# Patient Record
Sex: MALE | Race: WHITE | Employment: OTHER | URBAN - METROPOLITAN AREA
[De-identification: names, ages, dates, MRNs, and addresses within clinical notes are randomized per-mention and may not be internally consistent; named-entity substitution may affect disease eponyms.]

---

## 2017-05-08 ENCOUNTER — APPOINTMENT (OUTPATIENT)
Dept: GENERAL RADIOLOGY | Age: 62
DRG: 280 | End: 2017-05-08
Attending: FAMILY MEDICINE
Payer: MEDICARE

## 2017-05-08 ENCOUNTER — HOSPITAL ENCOUNTER (INPATIENT)
Age: 62
LOS: 10 days | Discharge: HOME | DRG: 280 | End: 2017-05-18
Attending: FAMILY MEDICINE | Admitting: FAMILY MEDICINE
Payer: MEDICARE

## 2017-05-08 DIAGNOSIS — R06.02 SHORTNESS OF BREATH: ICD-10-CM

## 2017-05-08 DIAGNOSIS — G89.4 CHRONIC PAIN SYNDROME: ICD-10-CM

## 2017-05-08 DIAGNOSIS — M54.2 NECK PAIN: ICD-10-CM

## 2017-05-08 DIAGNOSIS — R53.83 FATIGUE, UNSPECIFIED TYPE: ICD-10-CM

## 2017-05-08 PROBLEM — T40.601A OVERDOSE OPIATE (HCC): Status: ACTIVE | Noted: 2017-05-08

## 2017-05-08 PROBLEM — J18.9 PNEUMONIA: Status: ACTIVE | Noted: 2017-05-08

## 2017-05-08 PROBLEM — I21.4 NSTEMI (NON-ST ELEVATED MYOCARDIAL INFARCTION) (HCC): Status: ACTIVE | Noted: 2017-05-08

## 2017-05-08 PROBLEM — G89.29 CHRONIC PAIN: Status: ACTIVE | Noted: 2017-05-08

## 2017-05-08 LAB
ALBUMIN SERPL BCP-MCNC: 2.1 G/DL (ref 3.5–5)
ALBUMIN/GLOB SERPL: 0.6 {RATIO} (ref 1.1–2.2)
ALP SERPL-CCNC: 66 U/L (ref 45–117)
ALT SERPL-CCNC: 41 U/L (ref 12–78)
ANION GAP BLD CALC-SCNC: 6 MMOL/L (ref 5–15)
APPEARANCE UR: ABNORMAL
APTT PPP: 29.2 SEC (ref 22.1–32.5)
AST SERPL W P-5'-P-CCNC: 48 U/L (ref 15–37)
BACTERIA URNS QL MICRO: NEGATIVE /HPF
BASOPHILS # BLD AUTO: 0 K/UL (ref 0–0.1)
BASOPHILS # BLD: 1 % (ref 0–1)
BILIRUB SERPL-MCNC: 0.4 MG/DL (ref 0.2–1)
BILIRUB UR QL CFM: NEGATIVE
BUN SERPL-MCNC: 18 MG/DL (ref 6–20)
BUN/CREAT SERPL: 27 (ref 12–20)
CALCIUM SERPL-MCNC: 7.9 MG/DL (ref 8.5–10.1)
CHLORIDE SERPL-SCNC: 107 MMOL/L (ref 97–108)
CO2 SERPL-SCNC: 28 MMOL/L (ref 21–32)
COLOR UR: ABNORMAL
CREAT SERPL-MCNC: 0.67 MG/DL (ref 0.7–1.3)
EOSINOPHIL # BLD: 0.3 K/UL (ref 0–0.4)
EOSINOPHIL NFR BLD: 5 % (ref 0–7)
EPITH CASTS URNS QL MICRO: ABNORMAL /LPF
ERYTHROCYTE [DISTWIDTH] IN BLOOD BY AUTOMATED COUNT: 16 % (ref 11.5–14.5)
GLOBULIN SER CALC-MCNC: 3.7 G/DL (ref 2–4)
GLUCOSE SERPL-MCNC: 90 MG/DL (ref 65–100)
GLUCOSE UR STRIP.AUTO-MCNC: NEGATIVE MG/DL
HCT VFR BLD AUTO: 34.7 % (ref 36.6–50.3)
HGB BLD-MCNC: 11.4 G/DL (ref 12.1–17)
HGB UR QL STRIP: ABNORMAL
KETONES UR QL STRIP.AUTO: ABNORMAL MG/DL
LACTATE SERPL-SCNC: 1.4 MMOL/L (ref 0.4–2)
LEUKOCYTE ESTERASE UR QL STRIP.AUTO: NEGATIVE
LYMPHOCYTES # BLD AUTO: 30 % (ref 12–49)
LYMPHOCYTES # BLD: 1.9 K/UL (ref 0.8–3.5)
MCH RBC QN AUTO: 30.3 PG (ref 26–34)
MCHC RBC AUTO-ENTMCNC: 32.9 G/DL (ref 30–36.5)
MCV RBC AUTO: 92.3 FL (ref 80–99)
MONOCYTES # BLD: 0.8 K/UL (ref 0–1)
MONOCYTES NFR BLD AUTO: 13 % (ref 5–13)
NEUTS SEG # BLD: 3.1 K/UL (ref 1.8–8)
NEUTS SEG NFR BLD AUTO: 51 % (ref 32–75)
NITRITE UR QL STRIP.AUTO: NEGATIVE
PH UR STRIP: 6 [PH] (ref 5–8)
PLATELET # BLD AUTO: 72 K/UL (ref 150–400)
POTASSIUM SERPL-SCNC: 4.4 MMOL/L (ref 3.5–5.1)
PROT SERPL-MCNC: 5.8 G/DL (ref 6.4–8.2)
PROT UR STRIP-MCNC: 100 MG/DL
RBC # BLD AUTO: 3.76 M/UL (ref 4.1–5.7)
RBC #/AREA URNS HPF: ABNORMAL /HPF (ref 0–5)
SODIUM SERPL-SCNC: 141 MMOL/L (ref 136–145)
SP GR UR REFRACTOMETRY: 1.02 (ref 1–1.03)
THERAPEUTIC RANGE,PTTT: NORMAL SECS (ref 58–77)
TROPONIN I SERPL-MCNC: 0.15 NG/ML
UROBILINOGEN UR QL STRIP.AUTO: 2 EU/DL (ref 0.2–1)
WBC # BLD AUTO: 6.2 K/UL (ref 4.1–11.1)
WBC URNS QL MICRO: ABNORMAL /HPF (ref 0–4)

## 2017-05-08 PROCEDURE — 65620000000 HC RM CCU GENERAL

## 2017-05-08 PROCEDURE — 87205 SMEAR GRAM STAIN: CPT | Performed by: FAMILY MEDICINE

## 2017-05-08 PROCEDURE — 85025 COMPLETE CBC W/AUTO DIFF WBC: CPT | Performed by: FAMILY MEDICINE

## 2017-05-08 PROCEDURE — 85730 THROMBOPLASTIN TIME PARTIAL: CPT | Performed by: FAMILY MEDICINE

## 2017-05-08 PROCEDURE — 81001 URINALYSIS AUTO W/SCOPE: CPT | Performed by: FAMILY MEDICINE

## 2017-05-08 PROCEDURE — 74011250636 HC RX REV CODE- 250/636: Performed by: INTERNAL MEDICINE

## 2017-05-08 PROCEDURE — 74011000258 HC RX REV CODE- 258: Performed by: FAMILY MEDICINE

## 2017-05-08 PROCEDURE — 74011250636 HC RX REV CODE- 250/636: Performed by: FAMILY MEDICINE

## 2017-05-08 PROCEDURE — 87070 CULTURE OTHR SPECIMN AEROBIC: CPT | Performed by: FAMILY MEDICINE

## 2017-05-08 PROCEDURE — 74000 XR ABD PORT  1 V: CPT

## 2017-05-08 PROCEDURE — 80053 COMPREHEN METABOLIC PANEL: CPT | Performed by: FAMILY MEDICINE

## 2017-05-08 PROCEDURE — 94002 VENT MGMT INPAT INIT DAY: CPT

## 2017-05-08 PROCEDURE — 84484 ASSAY OF TROPONIN QUANT: CPT | Performed by: FAMILY MEDICINE

## 2017-05-08 PROCEDURE — 83605 ASSAY OF LACTIC ACID: CPT | Performed by: FAMILY MEDICINE

## 2017-05-08 PROCEDURE — 74011250637 HC RX REV CODE- 250/637: Performed by: FAMILY MEDICINE

## 2017-05-08 PROCEDURE — 5A1945Z RESPIRATORY VENTILATION, 24-96 CONSECUTIVE HOURS: ICD-10-PCS | Performed by: INTERNAL MEDICINE

## 2017-05-08 PROCEDURE — 36415 COLL VENOUS BLD VENIPUNCTURE: CPT | Performed by: FAMILY MEDICINE

## 2017-05-08 PROCEDURE — 71010 XR CHEST PORT: CPT

## 2017-05-08 RX ORDER — PROPOFOL 10 MG/ML
5-50 VIAL (ML) INTRAVENOUS
Status: DISCONTINUED | OUTPATIENT
Start: 2017-05-08 | End: 2017-05-12

## 2017-05-08 RX ORDER — GUAIFENESIN 100 MG/5ML
81 LIQUID (ML) ORAL DAILY
Status: DISCONTINUED | OUTPATIENT
Start: 2017-05-09 | End: 2017-05-18 | Stop reason: HOSPADM

## 2017-05-08 RX ORDER — SODIUM CHLORIDE 0.9 % (FLUSH) 0.9 %
5-10 SYRINGE (ML) INJECTION EVERY 8 HOURS
Status: DISCONTINUED | OUTPATIENT
Start: 2017-05-08 | End: 2017-05-18 | Stop reason: HOSPADM

## 2017-05-08 RX ORDER — MIDAZOLAM IN 0.9 % SOD.CHLORID 1 MG/ML
12 PLASTIC BAG, INJECTION (ML) INTRAVENOUS
Status: DISCONTINUED | OUTPATIENT
Start: 2017-05-08 | End: 2017-05-09

## 2017-05-08 RX ORDER — SODIUM CHLORIDE 0.9 % (FLUSH) 0.9 %
5-10 SYRINGE (ML) INJECTION AS NEEDED
Status: DISCONTINUED | OUTPATIENT
Start: 2017-05-08 | End: 2017-05-18 | Stop reason: HOSPADM

## 2017-05-08 RX ORDER — CARVEDILOL 3.12 MG/1
3.12 TABLET ORAL 2 TIMES DAILY WITH MEALS
Status: DISCONTINUED | OUTPATIENT
Start: 2017-05-09 | End: 2017-05-09

## 2017-05-08 RX ORDER — FUROSEMIDE 10 MG/ML
40 INJECTION INTRAMUSCULAR; INTRAVENOUS 2 TIMES DAILY
Status: DISCONTINUED | OUTPATIENT
Start: 2017-05-08 | End: 2017-05-13

## 2017-05-08 RX ORDER — HEPARIN SODIUM 10000 [USP'U]/100ML
9-25 INJECTION, SOLUTION INTRAVENOUS
Status: DISPENSED | OUTPATIENT
Start: 2017-05-08 | End: 2017-05-11

## 2017-05-08 RX ORDER — ATORVASTATIN CALCIUM 40 MG/1
40 TABLET, FILM COATED ORAL EVERY EVENING
Status: DISCONTINUED | OUTPATIENT
Start: 2017-05-08 | End: 2017-05-18 | Stop reason: HOSPADM

## 2017-05-08 RX ORDER — FLUCONAZOLE 2 MG/ML
400 INJECTION, SOLUTION INTRAVENOUS EVERY 24 HOURS
Status: DISCONTINUED | OUTPATIENT
Start: 2017-05-08 | End: 2017-05-10

## 2017-05-08 RX ORDER — LEVOFLOXACIN 5 MG/ML
750 INJECTION, SOLUTION INTRAVENOUS EVERY 24 HOURS
Status: DISCONTINUED | OUTPATIENT
Start: 2017-05-08 | End: 2017-05-12

## 2017-05-08 RX ADMIN — ATORVASTATIN CALCIUM 40 MG: 40 TABLET, FILM COATED ORAL at 22:12

## 2017-05-08 RX ADMIN — FUROSEMIDE 40 MG: 10 INJECTION, SOLUTION INTRAMUSCULAR; INTRAVENOUS at 22:12

## 2017-05-08 RX ADMIN — HEPARIN SODIUM AND DEXTROSE 9 UNITS/KG/HR: 10000; 5 INJECTION INTRAVENOUS at 22:48

## 2017-05-08 RX ADMIN — Medication 10 ML: at 22:13

## 2017-05-08 RX ADMIN — LEVOFLOXACIN 750 MG: 5 INJECTION, SOLUTION INTRAVENOUS at 22:12

## 2017-05-08 RX ADMIN — FLUCONAZOLE 400 MG: 2 INJECTION INTRAVENOUS at 22:12

## 2017-05-08 RX ADMIN — PIPERACILLIN SODIUM,TAZOBACTAM SODIUM 3.38 G: 3; .375 INJECTION, POWDER, FOR SOLUTION INTRAVENOUS at 22:12

## 2017-05-08 RX ADMIN — PROPOFOL 20 MCG/KG/MIN: 10 INJECTION, EMULSION INTRAVENOUS at 20:46

## 2017-05-08 RX ADMIN — Medication 12 MG/HR: at 19:11

## 2017-05-08 NOTE — PROGRESS NOTES
1630: TRANSFER - IN REPORT:  Verbal report received from [facility] (name) on [facility]  being received from [facility]  (unit) for change in patient condition(CHF exacerbation )    Report consisted of patients Situation, Background, Assessment and   Recommendations(SBAR). Information from the following report(s) SBAR, Kardex, Intake/Output, MAR and Recent Results was reviewed with the receiving nurse. Opportunity for questions and clarification was provided. Assessment completed upon patients arrival to unit and care assumed. 1900: Patient arrives to unit with versed gtt infusing, intubated, and in bilateral wrist restraints. Primary Nurse Stefanie Mendes RN and Hien Carreon RN performed a dual skin assessment on this patient Impairment noted- see wound doc flow sheet  Kavin score is 12  1925: ESBL swabs sent. 1930: Bedside and Verbal shift change report given to [address] [facility]  (oncoming nurse) by [address] (offgoing nurse). Report included the following information SBAR, Kardex, Intake/Output, MAR and Recent Results.

## 2017-05-08 NOTE — IP AVS SNAPSHOT
[address] 
[phone] Patient: Miley Raygoza MRN: [id number] VJK:[id number] You are allergic to the following Allergen Reactions Iodine Rash Recent Documentation Height Weight BMI  
  
  
 1.676 m 91.1 kg 32.41 kg/m2 Emergency Contacts  (Rel.) Home Phone Work Phone Mobile Phone Princess Melendrez (Spouse) [phone] -- -- About your hospitalization You were admitted on: May 8, 2017 You last received care in the:  [facility] You were discharged on:  May 18, 2017 Why you were hospitalized Your primary diagnosis was:  Nstemi (Non-St Elevated Myocardial Infarction) (Hcc) Your diagnoses also included:  Pneumonia, Overdose Opiate, Chronic Pain Providers Seen During Your Hospitalizations Provider Role Specialty Primary office phone Carl Cao MD Attending Provider Hospitalist [phone] Ana Mills MD Attending Provider Internal Medicine [phone] Fanta Horner MD Attending Provider Internal Medicine [phone] Your Primary Care Physician (PCP) Primary Care Physician Office Phone Office Fax Salters Call [phone] Follow-up Information Follow up With Details Comments Contact Info MRM 2 CARDIOPULMONARY CARE Call in 1 week enrollment in cardiac rehab [address] 
[phone] Monse Joseph In 1 week f/u for heart failure and for general medical as well as recent PNA [address] [address] [facility] [address] [phone] Current Discharge Medication List  
  
START taking these medications Dose & Instructions Dispensing Information Comments Morning Noon Evening Bedtime  
 aspirin 81 mg chewable tablet Your last dose was: Your next dose is:    
   
   
 Dose:  81 mg Take 1 Tab by mouth daily. Quantity:  100 Tab Refills:  0  
     
   
   
   
  
 atorvastatin 40 mg tablet Commonly known as:  LIPITOR Your last dose was: Your next dose is:    
   
   
 Dose:  40 mg Take 1 Tab by mouth every evening. Quantity:  30 Tab Refills:  0  
     
   
   
   
  
 carvedilol 12.5 mg tablet Commonly known as:  Meghan Bosworth Your last dose was: Your next dose is:    
   
   
 Dose:  12.5 mg Take 1 Tab by mouth two (2) times daily (with meals). Quantity:  60 Tab Refills:  0  
     
   
   
   
  
 furosemide 40 mg tablet Commonly known as:  LASIX Your last dose was: Your next dose is:    
   
   
 Dose:  80 mg Take 2 Tabs by mouth daily. Quantity:  60 Tab Refills:  0  
     
   
   
   
  
 polyethylene glycol 17 gram packet Commonly known as:  Elbridge Humphries Your last dose was: Your next dose is:    
   
   
 Dose:  17 g Take 1 Packet by mouth daily. Quantity:  100 Packet Refills:  0 CONTINUE these medications which have NOT CHANGED Dose & Instructions Dispensing Information Comments Morning Noon Evening Bedtime DULoxetine 60 mg capsule Commonly known as:  CYMBALTA Your last dose was: Your next dose is:    
   
   
 Dose:  60 mg Take 60 mg by mouth daily. Refills:  0  
     
   
   
   
  
 gabapentin 800 mg tablet Commonly known as:  NEURONTIN Your last dose was: Your next dose is:    
   
   
 Dose:  800 mg Take 800 mg by mouth three (3) times daily. Refills:  0  
     
   
   
   
  
 lansoprazole 30 mg capsule Commonly known as:  PREVACID Your last dose was: Your next dose is:    
   
   
 Dose:  30 mg Take 30 mg by mouth Daily (before breakfast). Refills:  0  
     
   
   
   
  
 * Morphine 100 mg ER capsule Commonly known as:  LAUREN Your last dose was:     
   
Your next dose is:    
   
   
 Dose:  100 mg  
 Take 100 mg by mouth nightly. Refills:  0  
     
   
   
   
  
 * Morphine 80 mg Cerp Your last dose was: Your next dose is:    
   
   
 Dose:  80 mg Take 80 mg by mouth daily. Refills:  0  
     
   
   
   
  
 ramipril 10 mg capsule Commonly known as:  ALTACE Your last dose was: Your next dose is:    
   
   
 Dose:  10 mg Take 10 mg by mouth daily. Refills:  0  
     
   
   
   
  
 * Notice: This list has 2 medication(s) that are the same as other medications prescribed for you. Read the directions carefully, and ask your doctor or other care provider to review them with you. STOP taking these medications   
 amLODIPine 5 mg tablet Commonly known as:  NORVASC  
   
  
 atenolol 25 mg tablet Commonly known as:  TENORMIN  
   
  
 cloNIDine HCl 0.1 mg tablet Commonly known as:  CATAPRES Where to Get Your Medications Information on where to get these meds will be given to you by the nurse or doctor. ! Ask your nurse or doctor about these medications  
  aspirin 81 mg chewable tablet  
 atorvastatin 40 mg tablet  
 carvedilol 12.5 mg tablet  
 furosemide 40 mg tablet  
 polyethylene glycol 17 gram packet Discharge Instructions Lakeisha Harris Smoking Cessation Program: Below you will find a link to a text/email based smoking cessation program. The program is individualized to meet the patient's needs. To enroll use this link [email or web address]. Discharge Instructions PATIENT ID: Caro Farr MRN: [id number] YOB: 1955 DATE OF ADMISSION: 5/8/2017  6:55 PM   
DATE OF DISCHARGE: 5/18/2017 PRIMARY CARE PROVIDER: Alena Donnelly ATTENDING PHYSICIAN: Haydee Torres MD 
DISCHARGING PROVIDER: Haydee Torres MD   
To contact this individual call [phone] and ask the  to page. If unavailable ask to be transferred the Adult Hospitalist Department. DISCHARGE DIAGNOSES PNA,   
 
CONSULTATIONS: IP CONSULT TO INTENSIVIST 
IP CONSULT TO INFECTIOUS DISEASES 
IP CONSULT TO PULMONOLOGY PROCEDURES/SURGERIES: * No surgery found * PENDING TEST RESULTS:  
At the time of discharge the following test results are still pending: na 
 
FOLLOW UP APPOINTMENTS:  
Follow-up Information Follow up With Details Comments Contact Info MRM 2 CARDIOPULMONARY CARE Call in 1 week enrollment in cardiac rehab [address] [facility] 
[phone] Maru Pavon In 1 week f/u for heart failure and for general medical as well as recent PNA [address] [address] [facility] [address] [phone] ADDITIONAL CARE RECOMMENDATIONS: na 
 
DIET: Cardiac Diet ACTIVITY: Activity as tolerated WOUND CARE: na 
 
EQUIPMENT needed: na 
 
 
  
 SNF/Inpatient Rehab/LTAC  
x Independent/assisted living Hospice Other:  
 
  
  
Signed:  
Keara Fuller MD 
5/18/2017 
8:36 AM 
 
 
Discharge Instructions Attachments/References HEART FAILURE: AVOIDING TRIGGERS (ENGLISH) Discharge Orders None MyChart Announcement  We are excited to announce that we are making your provider's discharge notes available to you in [employer]. You will see these notes when they are completed and signed by the physician that discharged you from your recent hospital stay. If you have any questions or concerns about any information you see in [employer], please call the Health Information Department where you were seen or reach out to your Primary Care Provider for more information about your plan of care. Introducing [facility]! Peg Hemp introduces [employer] patient portal. Now you can access parts of your medical record, email your doctor's office, and request medication refills online. 1. In your internet browser, go to [email or web address] 2. Click on the First Time User? Click Here link in the Sign In box. You will see the New Member Sign Up page. 3. Enter your [employer] Access Code exactly as it appears below. You will not need to use this code after youve completed the sign-up process. If you do not sign up before the expiration date, you must request a new code. · [employer] Access Code: [email or web address] Expires: 8/6/2017  6:54 PM 
 
4. Enter the last four digits of your Social Security Number (xxxx) and Date of Birth (mm/dd/yyyy) as indicated and click Submit. You will be taken to the next sign-up page. 5. Create a [email or web address] ID. This will be your [employer] login ID and cannot be changed, so think of one that is secure and easy to remember. 6. Create a [employer] password. You can change your password at any time. 7. Enter your Password Reset Question and Answer. This can be used at a later time if you forget your password. 8. Enter your e-mail address. You will receive e-mail notification when new information is available in [address] 9. Click Sign Up. You can now view and download portions of your medical record. 10. Click the Download Summary menu link to download a portable copy of your medical information. If you have questions, please visit the Frequently Asked Questions section of the MyChart website. Remember, MyChart is NOT to be used for urgent needs. For medical emergencies, dial 911. Now available from your iPhone and Android! General Information Please provide this summary of care documentation to your next provider. Patient Signature:  ____________________________________________________________ Date:  ____________________________________________________________  
  
Lisa De La Rosa Provider Signature:  ____________________________________________________________ Date:  ____________________________________________________________

## 2017-05-09 ENCOUNTER — APPOINTMENT (OUTPATIENT)
Dept: GENERAL RADIOLOGY | Age: 62
DRG: 280 | End: 2017-05-09
Attending: INTERNAL MEDICINE
Payer: MEDICARE

## 2017-05-09 ENCOUNTER — APPOINTMENT (OUTPATIENT)
Dept: CT IMAGING | Age: 62
DRG: 280 | End: 2017-05-09
Attending: INTERNAL MEDICINE
Payer: MEDICARE

## 2017-05-09 LAB
APPEARANCE UR: CLEAR
APTT PPP: 109.9 SEC (ref 22.1–32.5)
APTT PPP: 29.1 SEC (ref 22.1–32.5)
APTT PPP: 32.7 SEC (ref 22.1–32.5)
ARTERIAL PATENCY WRIST A: YES
ATRIAL RATE: 51 BPM
BACTERIA URNS QL MICRO: NEGATIVE /HPF
BASE EXCESS BLD CALC-SCNC: 3 MMOL/L
BASE EXCESS BLD CALC-SCNC: 3 MMOL/L
BASE EXCESS BLD CALC-SCNC: 7 MMOL/L
BASOPHILS # BLD AUTO: 0 K/UL (ref 0–0.1)
BASOPHILS # BLD: 0 % (ref 0–1)
BDY SITE: ABNORMAL
BILIRUB UR QL: NEGATIVE
CA-I BLD-SCNC: 1.1 MMOL/L (ref 1.12–1.32)
CALCULATED P AXIS, ECG09: 63 DEGREES
CALCULATED R AXIS, ECG10: 31 DEGREES
CALCULATED T AXIS, ECG11: 157 DEGREES
COLOR UR: ABNORMAL
DIAGNOSIS, 93000: NORMAL
DIFFERENTIAL METHOD BLD: ABNORMAL
EOSINOPHIL # BLD: 0.3 K/UL (ref 0–0.4)
EOSINOPHIL NFR BLD: 5 % (ref 0–7)
EPITH CASTS URNS QL MICRO: ABNORMAL /LPF
ERYTHROCYTE [DISTWIDTH] IN BLOOD BY AUTOMATED COUNT: 15.7 % (ref 11.5–14.5)
GAS FLOW.O2 O2 DELIVERY SYS: ABNORMAL L/MIN
GAS FLOW.O2 SETTING OXYMISER: 20 BPM
GAS FLOW.O2 SETTING OXYMISER: 24 BPM
GLUCOSE UR STRIP.AUTO-MCNC: NEGATIVE MG/DL
HCO3 BLD-SCNC: 25.8 MMOL/L (ref 22–26)
HCO3 BLD-SCNC: 26.5 MMOL/L (ref 22–26)
HCO3 BLD-SCNC: 33 MMOL/L (ref 22–26)
HCT VFR BLD AUTO: 33 % (ref 36.6–50.3)
HGB BLD-MCNC: 11 G/DL (ref 12.1–17)
HGB UR QL STRIP: ABNORMAL
HYALINE CASTS URNS QL MICRO: ABNORMAL /LPF (ref 0–5)
KETONES UR QL STRIP.AUTO: NEGATIVE MG/DL
LEUKOCYTE ESTERASE UR QL STRIP.AUTO: NEGATIVE
LYMPHOCYTES # BLD AUTO: 39 % (ref 12–49)
LYMPHOCYTES # BLD: 2.6 K/UL (ref 0.8–3.5)
MAGNESIUM SERPL-MCNC: 2.2 MG/DL (ref 1.6–2.4)
MCH RBC QN AUTO: 30.6 PG (ref 26–34)
MCHC RBC AUTO-ENTMCNC: 33.3 G/DL (ref 30–36.5)
MCV RBC AUTO: 91.9 FL (ref 80–99)
METAMYELOCYTES NFR BLD MANUAL: 2 %
MONOCYTES # BLD: 0.4 K/UL (ref 0–1)
MONOCYTES NFR BLD AUTO: 6 % (ref 5–13)
MYELOCYTES NFR BLD MANUAL: 3 %
NEUTS SEG # BLD: 3 K/UL (ref 1.8–8)
NEUTS SEG NFR BLD AUTO: 45 % (ref 32–75)
NITRITE UR QL STRIP.AUTO: NEGATIVE
O2/TOTAL GAS SETTING VFR VENT: 60 %
P-R INTERVAL, ECG05: 116 MS
PCO2 BLD: 29.2 MMHG (ref 35–45)
PCO2 BLD: 38.2 MMHG (ref 35–45)
PCO2 BLD: 67.3 MMHG (ref 35–45)
PEEP RESPIRATORY: 5 CMH2O
PH BLD: 7.3 [PH] (ref 7.35–7.45)
PH BLD: 7.45 [PH] (ref 7.35–7.45)
PH BLD: 7.55 [PH] (ref 7.35–7.45)
PH UR STRIP: 7 [PH] (ref 5–8)
PLATELET # BLD AUTO: 243 K/UL (ref 150–400)
PLATELET COMMENTS,PCOM: ABNORMAL
PO2 BLD: 62 MMHG (ref 80–100)
PO2 BLD: 68 MMHG (ref 80–100)
PO2 BLD: 92 MMHG (ref 80–100)
PROT UR STRIP-MCNC: NEGATIVE MG/DL
Q-T INTERVAL, ECG07: 570 MS
QRS DURATION, ECG06: 88 MS
QTC CALCULATION (BEZET), ECG08: 525 MS
RBC # BLD AUTO: 3.59 M/UL (ref 4.1–5.7)
RBC #/AREA URNS HPF: ABNORMAL /HPF (ref 0–5)
RBC MORPH BLD: ABNORMAL
SAO2 % BLD: 90 % (ref 92–97)
SAO2 % BLD: 95 % (ref 92–97)
SAO2 % BLD: 98 % (ref 92–97)
SP GR UR REFRACTOMETRY: 1.01 (ref 1–1.03)
SPECIMEN TYPE: ABNORMAL
THERAPEUTIC RANGE,PTTT: ABNORMAL SECS (ref 58–77)
THERAPEUTIC RANGE,PTTT: ABNORMAL SECS (ref 58–77)
THERAPEUTIC RANGE,PTTT: NORMAL SECS (ref 58–77)
TOTAL RESP. RATE, ITRR: 20
TOTAL RESP. RATE, ITRR: 20
TROPONIN I SERPL-MCNC: 0.08 NG/ML
TROPONIN I SERPL-MCNC: 0.12 NG/ML
UA: UC IF INDICATED,UAUC: ABNORMAL
UROBILINOGEN UR QL STRIP.AUTO: 0.2 EU/DL (ref 0.2–1)
VENTILATION MODE VENT: ABNORMAL
VENTRICULAR RATE, ECG03: 51 BPM
VOLUME CONTROL IVLC: YES
VT SETTING VENT: 530 ML
VT SETTING VENT: 530 ML
VT SETTING VENT: 600 ML
WBC # BLD AUTO: 6.7 K/UL (ref 4.1–11.1)
WBC MORPH BLD: ABNORMAL
WBC URNS QL MICRO: ABNORMAL /HPF (ref 0–4)

## 2017-05-09 PROCEDURE — 74011000258 HC RX REV CODE- 258: Performed by: FAMILY MEDICINE

## 2017-05-09 PROCEDURE — 74011000250 HC RX REV CODE- 250: Performed by: INTERNAL MEDICINE

## 2017-05-09 PROCEDURE — 74011250636 HC RX REV CODE- 250/636: Performed by: FAMILY MEDICINE

## 2017-05-09 PROCEDURE — 70450 CT HEAD/BRAIN W/O DYE: CPT

## 2017-05-09 PROCEDURE — 85730 THROMBOPLASTIN TIME PARTIAL: CPT | Performed by: INTERNAL MEDICINE

## 2017-05-09 PROCEDURE — 86022 PLATELET ANTIBODIES: CPT | Performed by: NURSE PRACTITIONER

## 2017-05-09 PROCEDURE — 87040 BLOOD CULTURE FOR BACTERIA: CPT | Performed by: INTERNAL MEDICINE

## 2017-05-09 PROCEDURE — 82803 BLOOD GASES ANY COMBINATION: CPT

## 2017-05-09 PROCEDURE — 85730 THROMBOPLASTIN TIME PARTIAL: CPT | Performed by: FAMILY MEDICINE

## 2017-05-09 PROCEDURE — 74011250637 HC RX REV CODE- 250/637: Performed by: FAMILY MEDICINE

## 2017-05-09 PROCEDURE — 65620000000 HC RM CCU GENERAL

## 2017-05-09 PROCEDURE — 71010 XR CHEST PORT: CPT

## 2017-05-09 PROCEDURE — 74011250637 HC RX REV CODE- 250/637: Performed by: INTERNAL MEDICINE

## 2017-05-09 PROCEDURE — 84484 ASSAY OF TROPONIN QUANT: CPT | Performed by: FAMILY MEDICINE

## 2017-05-09 PROCEDURE — 93005 ELECTROCARDIOGRAM TRACING: CPT

## 2017-05-09 PROCEDURE — 05HY33Z INSERTION OF INFUSION DEVICE INTO UPPER VEIN, PERCUTANEOUS APPROACH: ICD-10-PCS | Performed by: INTERNAL MEDICINE

## 2017-05-09 PROCEDURE — 81001 URINALYSIS AUTO W/SCOPE: CPT | Performed by: INTERNAL MEDICINE

## 2017-05-09 PROCEDURE — 74011250636 HC RX REV CODE- 250/636: Performed by: INTERNAL MEDICINE

## 2017-05-09 PROCEDURE — 74011000258 HC RX REV CODE- 258: Performed by: INTERNAL MEDICINE

## 2017-05-09 PROCEDURE — 36600 WITHDRAWAL OF ARTERIAL BLOOD: CPT

## 2017-05-09 PROCEDURE — 36415 COLL VENOUS BLD VENIPUNCTURE: CPT | Performed by: FAMILY MEDICINE

## 2017-05-09 PROCEDURE — 83735 ASSAY OF MAGNESIUM: CPT | Performed by: INTERNAL MEDICINE

## 2017-05-09 PROCEDURE — 87070 CULTURE OTHR SPECIMN AEROBIC: CPT | Performed by: INTERNAL MEDICINE

## 2017-05-09 PROCEDURE — 85025 COMPLETE CBC W/AUTO DIFF WBC: CPT | Performed by: NURSE PRACTITIONER

## 2017-05-09 PROCEDURE — 94003 VENT MGMT INPAT SUBQ DAY: CPT

## 2017-05-09 RX ORDER — POLYVINYL ALCOHOL 14 MG/ML
2 SOLUTION/ DROPS OPHTHALMIC EVERY 8 HOURS
Status: DISCONTINUED | OUTPATIENT
Start: 2017-05-09 | End: 2017-05-12

## 2017-05-09 RX ORDER — HEPARIN SODIUM 1000 [USP'U]/ML
4000 INJECTION, SOLUTION INTRAVENOUS; SUBCUTANEOUS ONCE
Status: DISCONTINUED | OUTPATIENT
Start: 2017-05-09 | End: 2017-05-09 | Stop reason: SDUPTHER

## 2017-05-09 RX ORDER — CHLORHEXIDINE GLUCONATE 1.2 MG/ML
15 RINSE ORAL 2 TIMES DAILY
Status: DISCONTINUED | OUTPATIENT
Start: 2017-05-09 | End: 2017-05-12

## 2017-05-09 RX ORDER — FENTANYL CITRATE 50 UG/ML
50-100 INJECTION, SOLUTION INTRAMUSCULAR; INTRAVENOUS
Status: DISCONTINUED | OUTPATIENT
Start: 2017-05-09 | End: 2017-05-13

## 2017-05-09 RX ORDER — HEPARIN SODIUM 5000 [USP'U]/ML
4000 INJECTION, SOLUTION INTRAVENOUS; SUBCUTANEOUS ONCE
Status: COMPLETED | OUTPATIENT
Start: 2017-05-09 | End: 2017-05-09

## 2017-05-09 RX ADMIN — POLYVINYL ALCOHOL 2 DROP: 14 SOLUTION/ DROPS OPHTHALMIC at 10:16

## 2017-05-09 RX ADMIN — CHLORHEXIDINE GLUCONATE 15 ML: 1.2 RINSE ORAL at 10:15

## 2017-05-09 RX ADMIN — DEXMEDETOMIDINE HYDROCHLORIDE 0.7 MCG/KG/HR: 100 INJECTION, SOLUTION INTRAVENOUS at 11:03

## 2017-05-09 RX ADMIN — PIPERACILLIN SODIUM,TAZOBACTAM SODIUM 3.38 G: 3; .375 INJECTION, POWDER, FOR SOLUTION INTRAVENOUS at 13:35

## 2017-05-09 RX ADMIN — FLUCONAZOLE 400 MG: 2 INJECTION INTRAVENOUS at 20:43

## 2017-05-09 RX ADMIN — DEXMEDETOMIDINE HYDROCHLORIDE 0.2 MCG/KG/HR: 100 INJECTION, SOLUTION INTRAVENOUS at 20:59

## 2017-05-09 RX ADMIN — HEPARIN SODIUM 4000 UNITS: 5000 INJECTION, SOLUTION INTRAVENOUS; SUBCUTANEOUS at 15:58

## 2017-05-09 RX ADMIN — PIPERACILLIN SODIUM,TAZOBACTAM SODIUM 3.38 G: 3; .375 INJECTION, POWDER, FOR SOLUTION INTRAVENOUS at 22:13

## 2017-05-09 RX ADMIN — CHLORHEXIDINE GLUCONATE 15 ML: 1.2 RINSE ORAL at 18:00

## 2017-05-09 RX ADMIN — FENTANYL CITRATE 100 MCG: 50 INJECTION, SOLUTION INTRAMUSCULAR; INTRAVENOUS at 17:20

## 2017-05-09 RX ADMIN — PROPOFOL 30 MCG/KG/MIN: 10 INJECTION, EMULSION INTRAVENOUS at 18:09

## 2017-05-09 RX ADMIN — POLYVINYL ALCOHOL 2 DROP: 14 SOLUTION/ DROPS OPHTHALMIC at 14:00

## 2017-05-09 RX ADMIN — Medication 10 ML: at 22:13

## 2017-05-09 RX ADMIN — ATORVASTATIN CALCIUM 40 MG: 40 TABLET, FILM COATED ORAL at 17:17

## 2017-05-09 RX ADMIN — LEVOFLOXACIN 750 MG: 5 INJECTION, SOLUTION INTRAVENOUS at 20:48

## 2017-05-09 RX ADMIN — FUROSEMIDE 40 MG: 10 INJECTION, SOLUTION INTRAMUSCULAR; INTRAVENOUS at 08:41

## 2017-05-09 RX ADMIN — HEPARIN SODIUM 4000 UNITS: 5000 INJECTION, SOLUTION INTRAVENOUS; SUBCUTANEOUS at 07:58

## 2017-05-09 RX ADMIN — Medication 10 ML: at 05:28

## 2017-05-09 RX ADMIN — PROPOFOL 30 MCG/KG/MIN: 10 INJECTION, EMULSION INTRAVENOUS at 05:36

## 2017-05-09 RX ADMIN — FENTANYL CITRATE 100 MCG: 50 INJECTION, SOLUTION INTRAMUSCULAR; INTRAVENOUS at 13:36

## 2017-05-09 RX ADMIN — PROPOFOL 25 MCG/KG/MIN: 10 INJECTION, EMULSION INTRAVENOUS at 23:22

## 2017-05-09 RX ADMIN — FUROSEMIDE 40 MG: 10 INJECTION, SOLUTION INTRAMUSCULAR; INTRAVENOUS at 17:17

## 2017-05-09 RX ADMIN — PROPOFOL 40 MCG/KG/MIN: 10 INJECTION, EMULSION INTRAVENOUS at 01:06

## 2017-05-09 RX ADMIN — PIPERACILLIN SODIUM,TAZOBACTAM SODIUM 3.38 G: 3; .375 INJECTION, POWDER, FOR SOLUTION INTRAVENOUS at 05:27

## 2017-05-09 RX ADMIN — HEPARIN SODIUM AND DEXTROSE 17 UNITS/KG/HR: 10000; 5 INJECTION INTRAVENOUS at 15:53

## 2017-05-09 RX ADMIN — ASPIRIN 81 MG CHEWABLE TABLET 81 MG: 81 TABLET CHEWABLE at 09:53

## 2017-05-09 RX ADMIN — PROPOFOL 10 MCG/KG/MIN: 10 INJECTION, EMULSION INTRAVENOUS at 12:54
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
## 2017-05-09 NOTE — PROGRESS NOTES
5/9/2017     Admit Date: 5/8/2017    Admit Diagnosis: nstemi  NSTEMI (non-ST elevated myocardial infarction) [facility])    Principal Problem:    NSTEMI (non-ST elevated myocardial infarction) ([address] (5/8/2017)    Active Problems:    Pneumonia (5/8/2017)      Overdose opiate (5/8/2017)      Chronic pain (5/8/2017)        Assessment/Plan:  1. NSTEMI: peak troponin 2.30 on 5/5/17  2. Preserved EF on yesterday's echo; has diastolic heart failure with very high LV filling pressures from cath (> 30 mmHg) Aug 2016; has not been on diuretics at home recently  3. Pulmonary edema   4. Nonocclusive CAD from Aug 2016 cath  5. Acute respiratory failure with difficulty weaning the patient from the vent over the past several days  6. Deep scooping T wave inversion: coronary ischemia vs CNS   Plan:  1. Continue IV diuretics and heparin  2. Wean from vent as tolerated  3. Cath later this week  4. Head CT today       Subjective:  Sedated on vent       ALONZO Briones does not respond . Objective:     Visit Vitals    /69    Pulse (!) 50    Temp 96.9 °F (36.1 °C)    Resp 20    Wt 103.6 kg (228 lb 6.3 oz)    SpO2 99%        Physical Exam:  Neck: no carotid bruit and no JVD  Heart: regular rate and rhythm, S1, S2 normal  Lungs: clear to auscultation bilaterally, normal percussion bilaterally  Abdomen: soft  Extremities: extremities normal, atraumatic, no cyanosis or edema  Pulses: 2+ and symmetric  Neurologic: Mental status: sedated      Labs:    Recent Labs      05/09/17   0523   TROIQ  0.12*     Recent Labs      05/08/17   2142   NA  141   K  4.4   CL  107   BUN  18   CREA  0.67*   GLU  90   CA  7.9*     Recent Labs      05/09/17   0523   WBC  6.7   HGB  11.0*   HCT  33.0*   PLT  243     No results for input(s): TGL, CHOL, LDLC in the last 72 hours.     No lab exists for component: HDLC,  HBA1C    Telemetry: sinus bradycardia     Data Review: current meds, labs,recent radiology, intake/output/weight and problem list reviewed
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
## 2017-05-09 NOTE — PROCEDURES
Midline Insertion and Progress Note    PRE-PROCEDURE VERIFICATION  Correct Procedure: yes  Correct Site:  yes  Temperature: Temp: 97.4 °F (36.3 °C), Temperature Source: Temp Source: Axillary  Recent Labs      05/09/17 0523 05/08/17 2142   BUN   --   18   CREA   --   0.67*   PLT  243  72*   WBC  6.7  6.2     Allergies: Iodine    PROCEDURE DETAIL  A single lumen midline IV catheter was started for vascular access. The following documentation is in addition to the Midline properties in the lines/airways flowsheet :  Lot #: [id number]  Catheter Total Length: 10 (cm)  Vein Selection for Midline :left basilic  Complication Related to Insertion: none    Line is okay to use.

## 2017-05-09 NOTE — PROGRESS NOTES
Bedside, Verbal and Written shift change report given to Jennie (oncoming nurse) by Deyanira Rincon (offgoing nurse). Report included the following information SBAR, Kardex, MAR and Recent Results. 0800- Assumed care of pt. Pt is in SB- HR 55, Afebrile. Pt is vented. Pt withdraws to painful stimuli. Pupils reactive and brisk-3mm. Pt on diprivan, heparin gtt. Ortega is in place. 65- Spoke with - orders received. 0840- Pt to CT with RN, 2 RT's, on monitor. 5014- Pt returned from CT.  0955- at bedside-orders received  1015- at bedside-orders received. 1227- notified of latest ABG results- stated to increase TV to 600, rate to 24, and recheck ABG in 2 hours. Notified RT at this time. 1541-Repeat ABG obtained-  notified of results- stated to decrease TV to 550, rate to 18, and to recheck ABG in am. Notified RT and placing ABG order.

## 2017-05-09 NOTE — NURSE NAVIGATOR
Chart reviewed by Heart Failure Nurse Navigator. Heart Failure database completed. EF preserved EF per cardiology note. ACEi/ARB: Not indicated. BB: Not indicated. CRT Not indicated. NYHA Functional Class NSTEMI primary diagnosis. Heart Failure Teach Back in Patient Education. Heart Failure Avoiding Triggers on Discharge Instructions.
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
## 2017-05-09 NOTE — CDMP QUERY
Please clarify if this patient is being treated/managed for:    =>Acute on Chronic Diastolic CHF in the setting of pulmonary edema requiring lasix  =>Other Explanation of clinical findings  =>Unable to Determine (no explanation of clinical findings)    The medical record reflects the following clinical findings, treatment, and risk factors:    Risk Factors: hx of CHF  Clinical Indicators: H/P--Possible congestive heart failure exacerbation, Preserved EF on yesterday's echo; has diastolic heart failure with very high LV filling pressures from cath (> 30 mmHg) Aug 2016; has not been on diuretics at home recently. Pulmonary edema   Treatment: lasix    Please clarify and document your clinical opinion in the progress notes and discharge summary including the definitive and/or presumptive diagnosis, (suspected or probable), related to the above clinical findings. Please include clinical findings supporting your diagnosis.     Thank you,         Siobhan Middleton [facility]

## 2017-05-10 ENCOUNTER — APPOINTMENT (OUTPATIENT)
Dept: GENERAL RADIOLOGY | Age: 62
DRG: 280 | End: 2017-05-10
Attending: INTERNAL MEDICINE
Payer: MEDICARE

## 2017-05-10 LAB
ALBUMIN SERPL BCP-MCNC: 2.3 G/DL (ref 3.5–5)
ALBUMIN/GLOB SERPL: 0.5 {RATIO} (ref 1.1–2.2)
ALP SERPL-CCNC: 77 U/L (ref 45–117)
ALT SERPL-CCNC: 40 U/L (ref 12–78)
ANION GAP BLD CALC-SCNC: 10 MMOL/L (ref 5–15)
APTT PPP: 108.8 SEC (ref 22.1–32.5)
APTT PPP: 37.2 SEC (ref 22.1–32.5)
APTT PPP: 44.3 SEC (ref 22.1–32.5)
APTT PPP: 51.6 SEC (ref 22.1–32.5)
ARTERIAL PATENCY WRIST A: YES
AST SERPL W P-5'-P-CCNC: 28 U/L (ref 15–37)
ATRIAL RATE: 75 BPM
BACTERIA SPEC CULT: NORMAL
BASE EXCESS BLD CALC-SCNC: 3 MMOL/L
BASOPHILS # BLD AUTO: 0 K/UL (ref 0–0.1)
BASOPHILS # BLD: 0 % (ref 0–1)
BDY SITE: ABNORMAL
BILIRUB SERPL-MCNC: 0.5 MG/DL (ref 0.2–1)
BNP SERPL-MCNC: 190 PG/ML (ref 0–100)
BUN SERPL-MCNC: 16 MG/DL (ref 6–20)
BUN/CREAT SERPL: 21 (ref 12–20)
CALCIUM SERPL-MCNC: 8.5 MG/DL (ref 8.5–10.1)
CALCULATED P AXIS, ECG09: 61 DEGREES
CALCULATED R AXIS, ECG10: 47 DEGREES
CALCULATED T AXIS, ECG11: 143 DEGREES
CHLORIDE SERPL-SCNC: 102 MMOL/L (ref 97–108)
CK SERPL-CCNC: 98 U/L (ref 39–308)
CO2 SERPL-SCNC: 25 MMOL/L (ref 21–32)
CREAT SERPL-MCNC: 0.77 MG/DL (ref 0.7–1.3)
DIAGNOSIS, 93000: NORMAL
DIFFERENTIAL METHOD BLD: ABNORMAL
EOSINOPHIL # BLD: 0.6 K/UL (ref 0–0.4)
EOSINOPHIL NFR BLD: 8 % (ref 0–7)
ERYTHROCYTE [DISTWIDTH] IN BLOOD BY AUTOMATED COUNT: 15.4 % (ref 11.5–14.5)
GAS FLOW.O2 O2 DELIVERY SYS: ABNORMAL L/MIN
GLOBULIN SER CALC-MCNC: 4.3 G/DL (ref 2–4)
GLUCOSE SERPL-MCNC: 107 MG/DL (ref 65–100)
GRAM STN SPEC: NORMAL
GRAM STN SPEC: NORMAL
HCO3 BLD-SCNC: 27.4 MMOL/L (ref 22–26)
HCT VFR BLD AUTO: 35.8 % (ref 36.6–50.3)
HGB BLD-MCNC: 12.1 G/DL (ref 12.1–17)
INR PPP: 1.1 (ref 0.9–1.1)
LACTATE SERPL-SCNC: 0.6 MMOL/L (ref 0.4–2)
LYMPHOCYTES # BLD AUTO: 27 % (ref 12–49)
LYMPHOCYTES # BLD: 2.2 K/UL (ref 0.8–3.5)
MAGNESIUM SERPL-MCNC: 2.4 MG/DL (ref 1.6–2.4)
MCH RBC QN AUTO: 30.6 PG (ref 26–34)
MCHC RBC AUTO-ENTMCNC: 33.8 G/DL (ref 30–36.5)
MCV RBC AUTO: 90.4 FL (ref 80–99)
MONOCYTES # BLD: 0.8 K/UL (ref 0–1)
MONOCYTES NFR BLD AUTO: 10 % (ref 5–13)
NEUTS SEG # BLD: 4.4 K/UL (ref 1.8–8)
NEUTS SEG NFR BLD AUTO: 55 % (ref 32–75)
NRBC # BLD: 0 K/UL (ref 0–0.01)
NRBC BLD-RTO: 0 PER 100 WBC
O2/TOTAL GAS SETTING VFR VENT: 50 %
P-R INTERVAL, ECG05: 122 MS
PCO2 BLD: 39.8 MMHG (ref 35–45)
PEEP RESPIRATORY: 5 CMH2O
PF4 HEPARIN CMPLX AB SER-ACNC: 0.25 OD (ref 0–0.4)
PH BLD: 7.45 [PH] (ref 7.35–7.45)
PHOSPHATE SERPL-MCNC: 4.2 MG/DL (ref 2.6–4.7)
PLATELET # BLD AUTO: 285 K/UL (ref 150–400)
PLATELET COMMENTS,PCOM: ABNORMAL
PO2 BLD: 78 MMHG (ref 80–100)
POTASSIUM SERPL-SCNC: 4.4 MMOL/L (ref 3.5–5.1)
PRESSURE SUPPORT SETTING VENT: 5 CMH2O
PROT SERPL-MCNC: 6.6 G/DL (ref 6.4–8.2)
PROTHROMBIN TIME: 10.7 SEC (ref 9–11.1)
Q-T INTERVAL, ECG07: 462 MS
QRS DURATION, ECG06: 92 MS
QTC CALCULATION (BEZET), ECG08: 515 MS
RBC # BLD AUTO: 3.96 M/UL (ref 4.1–5.7)
RBC MORPH BLD: ABNORMAL
RBC MORPH BLD: ABNORMAL
SAO2 % BLD: 96 % (ref 92–97)
SERVICE CMNT-IMP: NORMAL
SODIUM SERPL-SCNC: 137 MMOL/L (ref 136–145)
SPECIMEN TYPE: ABNORMAL
THERAPEUTIC RANGE,PTTT: ABNORMAL SECS (ref 58–77)
TOTAL RESP. RATE, ITRR: 30
VENTILATION MODE VENT: ABNORMAL
VENTRICULAR RATE, ECG03: 75 BPM
WBC # BLD AUTO: 8 K/UL (ref 4.1–11.1)

## 2017-05-10 PROCEDURE — 74011250636 HC RX REV CODE- 250/636: Performed by: FAMILY MEDICINE

## 2017-05-10 PROCEDURE — 85025 COMPLETE CBC W/AUTO DIFF WBC: CPT | Performed by: INTERNAL MEDICINE

## 2017-05-10 PROCEDURE — 36600 WITHDRAWAL OF ARTERIAL BLOOD: CPT

## 2017-05-10 PROCEDURE — 85610 PROTHROMBIN TIME: CPT | Performed by: INTERNAL MEDICINE

## 2017-05-10 PROCEDURE — 83880 ASSAY OF NATRIURETIC PEPTIDE: CPT | Performed by: INTERNAL MEDICINE

## 2017-05-10 PROCEDURE — 74011000250 HC RX REV CODE- 250: Performed by: INTERNAL MEDICINE

## 2017-05-10 PROCEDURE — 74011250637 HC RX REV CODE- 250/637: Performed by: FAMILY MEDICINE

## 2017-05-10 PROCEDURE — 94003 VENT MGMT INPAT SUBQ DAY: CPT

## 2017-05-10 PROCEDURE — 82550 ASSAY OF CK (CPK): CPT | Performed by: INTERNAL MEDICINE

## 2017-05-10 PROCEDURE — 74011000258 HC RX REV CODE- 258: Performed by: FAMILY MEDICINE

## 2017-05-10 PROCEDURE — 84100 ASSAY OF PHOSPHORUS: CPT | Performed by: INTERNAL MEDICINE

## 2017-05-10 PROCEDURE — 74011250636 HC RX REV CODE- 250/636: Performed by: INTERNAL MEDICINE

## 2017-05-10 PROCEDURE — 74011000258 HC RX REV CODE- 258: Performed by: INTERNAL MEDICINE

## 2017-05-10 PROCEDURE — 93306 TTE W/DOPPLER COMPLETE: CPT

## 2017-05-10 PROCEDURE — 77030029131 HC ADMN ST IV BLD N DEHP ICUM -B

## 2017-05-10 PROCEDURE — 82803 BLOOD GASES ANY COMBINATION: CPT

## 2017-05-10 PROCEDURE — 85730 THROMBOPLASTIN TIME PARTIAL: CPT | Performed by: INTERNAL MEDICINE

## 2017-05-10 PROCEDURE — 36415 COLL VENOUS BLD VENIPUNCTURE: CPT | Performed by: INTERNAL MEDICINE

## 2017-05-10 PROCEDURE — 80053 COMPREHEN METABOLIC PANEL: CPT | Performed by: INTERNAL MEDICINE

## 2017-05-10 PROCEDURE — 83605 ASSAY OF LACTIC ACID: CPT | Performed by: INTERNAL MEDICINE

## 2017-05-10 PROCEDURE — 71010 XR CHEST PORT: CPT

## 2017-05-10 PROCEDURE — 93005 ELECTROCARDIOGRAM TRACING: CPT

## 2017-05-10 PROCEDURE — 65620000000 HC RM CCU GENERAL

## 2017-05-10 PROCEDURE — 83735 ASSAY OF MAGNESIUM: CPT | Performed by: INTERNAL MEDICINE

## 2017-05-10 RX ORDER — HYDROCORTISONE SODIUM SUCCINATE 100 MG/2ML
100 INJECTION, POWDER, FOR SOLUTION INTRAMUSCULAR; INTRAVENOUS ONCE
Status: COMPLETED | OUTPATIENT
Start: 2017-05-11 | End: 2017-05-11

## 2017-05-10 RX ORDER — CLONIDINE HYDROCHLORIDE 0.1 MG/1
TABLET ORAL 2 TIMES DAILY
COMMUNITY
End: 2017-05-18

## 2017-05-10 RX ORDER — MORPHINE SULFATE 80 MG/1
80 CAPSULE, EXTENDED RELEASE ORAL DAILY
COMMUNITY
End: 2017-05-26

## 2017-05-10 RX ORDER — LANSOPRAZOLE 30 MG/1
30 CAPSULE, DELAYED RELEASE ORAL
COMMUNITY

## 2017-05-10 RX ORDER — ATENOLOL 25 MG/1
25 TABLET ORAL 2 TIMES DAILY
COMMUNITY
End: 2017-05-18

## 2017-05-10 RX ORDER — RAMIPRIL 10 MG/1
10 CAPSULE ORAL DAILY
COMMUNITY

## 2017-05-10 RX ORDER — MORPHINE SULFATE 100 MG/1
100 CAPSULE, EXTENDED RELEASE ORAL
COMMUNITY
End: 2017-05-26

## 2017-05-10 RX ORDER — HEPARIN SODIUM 5000 [USP'U]/ML
2000 INJECTION, SOLUTION INTRAVENOUS; SUBCUTANEOUS ONCE
Status: COMPLETED | OUTPATIENT
Start: 2017-05-10 | End: 2017-05-10

## 2017-05-10 RX ORDER — GABAPENTIN 800 MG/1
800 TABLET ORAL 3 TIMES DAILY
COMMUNITY
End: 2017-05-26

## 2017-05-10 RX ORDER — SODIUM CHLORIDE 0.9 % (FLUSH) 0.9 %
20 SYRINGE (ML) INJECTION
Status: COMPLETED | OUTPATIENT
Start: 2017-05-10 | End: 2017-05-10

## 2017-05-10 RX ORDER — DULOXETIN HYDROCHLORIDE 60 MG/1
60 CAPSULE, DELAYED RELEASE ORAL DAILY
COMMUNITY
End: 2018-06-05

## 2017-05-10 RX ORDER — DIPHENHYDRAMINE HYDROCHLORIDE 50 MG/ML
25 INJECTION, SOLUTION INTRAMUSCULAR; INTRAVENOUS ONCE
Status: COMPLETED | OUTPATIENT
Start: 2017-05-11 | End: 2017-05-11

## 2017-05-10 RX ORDER — HEPARIN SODIUM 5000 [USP'U]/ML
4000 INJECTION, SOLUTION INTRAVENOUS; SUBCUTANEOUS ONCE
Status: COMPLETED | OUTPATIENT
Start: 2017-05-10 | End: 2017-05-10

## 2017-05-10 RX ORDER — AMLODIPINE BESYLATE 5 MG/1
5 TABLET ORAL DAILY
COMMUNITY
End: 2017-05-18

## 2017-05-10 RX ADMIN — POLYVINYL ALCOHOL 2 DROP: 14 SOLUTION/ DROPS OPHTHALMIC at 05:43

## 2017-05-10 RX ADMIN — PIPERACILLIN SODIUM,TAZOBACTAM SODIUM 3.38 G: 3; .375 INJECTION, POWDER, FOR SOLUTION INTRAVENOUS at 22:09

## 2017-05-10 RX ADMIN — Medication 100 MCG/HR: at 13:16

## 2017-05-10 RX ADMIN — FENTANYL CITRATE 100 MCG: 50 INJECTION, SOLUTION INTRAMUSCULAR; INTRAVENOUS at 09:31

## 2017-05-10 RX ADMIN — HEPARIN SODIUM 4000 UNITS: 5000 INJECTION, SOLUTION INTRAVENOUS; SUBCUTANEOUS at 06:50

## 2017-05-10 RX ADMIN — FENTANYL CITRATE 100 MCG: 50 INJECTION, SOLUTION INTRAMUSCULAR; INTRAVENOUS at 17:40

## 2017-05-10 RX ADMIN — DEXMEDETOMIDINE HYDROCHLORIDE 0.4 MCG/KG/HR: 100 INJECTION, SOLUTION INTRAVENOUS at 10:53

## 2017-05-10 RX ADMIN — PROPOFOL 15 MCG/KG/MIN: 10 INJECTION, EMULSION INTRAVENOUS at 05:43

## 2017-05-10 RX ADMIN — POLYVINYL ALCOHOL 2 DROP: 14 SOLUTION/ DROPS OPHTHALMIC at 22:11

## 2017-05-10 RX ADMIN — PIPERACILLIN SODIUM,TAZOBACTAM SODIUM 3.38 G: 3; .375 INJECTION, POWDER, FOR SOLUTION INTRAVENOUS at 05:43

## 2017-05-10 RX ADMIN — DEXMEDETOMIDINE HYDROCHLORIDE 1.4 MCG/KG/HR: 100 INJECTION, SOLUTION INTRAVENOUS at 14:44

## 2017-05-10 RX ADMIN — ATORVASTATIN CALCIUM 40 MG: 40 TABLET, FILM COATED ORAL at 17:27

## 2017-05-10 RX ADMIN — LEVOFLOXACIN 750 MG: 5 INJECTION, SOLUTION INTRAVENOUS at 20:48

## 2017-05-10 RX ADMIN — Medication 20 ML: at 09:43

## 2017-05-10 RX ADMIN — FUROSEMIDE 40 MG: 10 INJECTION, SOLUTION INTRAMUSCULAR; INTRAVENOUS at 17:31

## 2017-05-10 RX ADMIN — CHLORHEXIDINE GLUCONATE 15 ML: 1.2 RINSE ORAL at 09:03

## 2017-05-10 RX ADMIN — DEXMEDETOMIDINE HYDROCHLORIDE 1.4 MCG/KG/HR: 100 INJECTION, SOLUTION INTRAVENOUS at 20:40

## 2017-05-10 RX ADMIN — POLYVINYL ALCOHOL 2 DROP: 14 SOLUTION/ DROPS OPHTHALMIC at 02:53

## 2017-05-10 RX ADMIN — SODIUM CHLORIDE 1.5 ML: 9 INJECTION INTRAMUSCULAR; INTRAVENOUS; SUBCUTANEOUS at 09:43

## 2017-05-10 RX ADMIN — HEPARIN SODIUM 2000 UNITS: 5000 INJECTION, SOLUTION INTRAVENOUS; SUBCUTANEOUS at 15:09

## 2017-05-10 RX ADMIN — CHLORHEXIDINE GLUCONATE 15 ML: 1.2 RINSE ORAL at 21:46

## 2017-05-10 RX ADMIN — Medication 10 ML: at 05:43

## 2017-05-10 RX ADMIN — FENTANYL CITRATE 100 MCG: 50 INJECTION, SOLUTION INTRAMUSCULAR; INTRAVENOUS at 10:36

## 2017-05-10 RX ADMIN — ASPIRIN 81 MG CHEWABLE TABLET 81 MG: 81 TABLET CHEWABLE at 08:57

## 2017-05-10 RX ADMIN — Medication 150 MCG/HR: at 21:44

## 2017-05-10 RX ADMIN — PIPERACILLIN SODIUM,TAZOBACTAM SODIUM 3.38 G: 3; .375 INJECTION, POWDER, FOR SOLUTION INTRAVENOUS at 14:44

## 2017-05-10 RX ADMIN — FUROSEMIDE 40 MG: 10 INJECTION, SOLUTION INTRAMUSCULAR; INTRAVENOUS at 08:57

## 2017-05-10 RX ADMIN — POLYVINYL ALCOHOL 2 DROP: 14 SOLUTION/ DROPS OPHTHALMIC at 14:44

## 2017-05-10 RX ADMIN — DEXMEDETOMIDINE HYDROCHLORIDE 1.4 MCG/KG/HR: 100 INJECTION, SOLUTION INTRAVENOUS at 17:53

## 2017-05-10 RX ADMIN — Medication 10 ML: at 21:08

## 2017-05-10 RX ADMIN — HEPARIN SODIUM AND DEXTROSE 20 UNITS/KG/HR: 10000; 5 INJECTION INTRAVENOUS at 20:00

## 2017-05-10 RX ADMIN — DEXMEDETOMIDINE HYDROCHLORIDE 1.4 MCG/KG/HR: 100 INJECTION, SOLUTION INTRAVENOUS at 23:39

## 2017-05-10 NOTE — PROGRESS NOTES
1735- Patient agitated. Flailing arms, pulling at restraints, and putting legs out of the bed. Extra dose of Fentanyl given now to calm patient.
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
## 2017-05-10 NOTE — PROGRESS NOTES
0730:Shift change report received from [place] [id number] [facility]. SBAR, Kardex, Intake/Output, MAR, Recent Results and Cardiac Rhythm SB/NSR reviewed. 0820: Propofol stopped. Attempting SBT.     5346: Pt apenic with no respiratory attempt. Placed back on A/C. Will give pt more time to wake up.    0900: ECHO tech at bedside. 6172: Pt coughing uncontrollably around ETT with copious secretions, restless. Increased precedex to 1.4. Pt placed on SBT. Pt currently tachypenic, but taking 300+ TV. RT spoke with Dr. Katheryn Gore, orders to push fentanyl now and obtain ABG in 20 min. RT made aware. 1200: Spoke with Dr. Katheryn Gore about extubating pt. Waiting to extubate per Dr. Bobby Yi till after cath procedure and swan line placement. Will resedate pt and place back on A/C.     1530: Bedside and Verbal shift change report given to Tamie [facility]. (oncoming nurse) by Feli Hernández (offgoing nurse). Report included the following information SBAR, Kardex, Intake/Output, MAR, Recent Results and Cardiac Rhythm NSR/SB.

## 2017-05-10 NOTE — PROGRESS NOTES
Problem: Infection - Risk of, Multi-drug Resistant Organism Colonization (MDRO)  Goal: *Absence of MDRO colonization  Outcome: Progressing Towards Goal  Contact isolation utilized and followed. Problem: Patient Education: Go to Patient Education Activity  Goal: Patient/Family Education  Outcome: Progressing Towards Goal  Family and visitor education completed with contact isolation and proper procedure.
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
## 2017-05-10 NOTE — PROGRESS NOTES
1930: SBAR received from [id number] [facility]: Patient on sedation and intubated. RASS +1. VSS.   2100: RASS +2. Increased propofol to 35 mcg/kg/min and restarted precedex at 0.2 mcg/kg/hr. Will monitor patient. 2305:   IV heparin rate has been adjusted based on the most recent PTT results. Per protocol heparin gtt will be stopped for one hour and then resume at a decreased of 3 units/kg/hr resulting in a rate of 14 units/kg/hr. Lab Results   Component Value Date/Time    aPTT 29.1 05/09/2017 02:51 PM   2330: Bedside and Verbal shift change report given to [address] [place] [address] (oncoming nurse) by Yee Ramos  (offgoing nurse). Report included the following information SBAR, Kardex, Intake/Output, MAR and Recent Results. 2320: RASS -3. Decreased propofol to 25 mcg/kg/min. 2330: Bedside and Verbal shift change report given to [address] [place] [address] (oncoming nurse) by Yee Ramos (offgoing nurse). Report included the following information SBAR, Kardex, Intake/Output, MAR and Recent Results.

## 2017-05-10 NOTE — PROGRESS NOTES
CM reviewed case with treatment team during Bedside Interdisciplinary Rounds. Patient continues with vent. Plan for cardiac cath Thursday 5/11/17. CM to continue to follow for discharge planning needs.

## 2017-05-10 NOTE — PROGRESS NOTES
Problem: Pressure Injury - Risk of  Goal: *Prevention of pressure ulcer  Outcome: Progressing Towards Goal  Q2 turning. Problem: Non-Violent Restraints  Goal: *No harm/injury to patient while restraints in use  Outcome: Progressing Towards Goal  No S/S of injury. Q2 restraint check. Problem: General Infection Care Plan (Adult and Pediatric)  Goal: *Absence of infection signs and symptoms  Outcome: Progressing Towards Goal  Afebrile, WBC- WDL. VSS. Problem: Infection - Risk of, Ventilator-Associated Pneumonia  Goal: *Absence of infection signs and symptoms  Outcome: Progressing Towards Goal  Afebrile, WBC- WDL. VSS.

## 2017-05-10 NOTE — PROGRESS NOTES
I spoke with Mr. Pastor Morrison wife about plans to proceed with cath/PCI  tomorrow. I reviewed the procedure and the risks with Mrs Honey Mojica. The risks discussed include but are not limited to: stroke, myocardial infarction, bleeding and vascular trauma, emergency surgery and contrast reactions. Mrs Derian Herndon agrees to give consent for the procedure. Verbal consent was obtained by the RN Rodolfo Dye

## 2017-05-10 NOTE — PROGRESS NOTES
5/10/2017     Admit Date: 5/8/2017    Admit Diagnosis: nstemi  NSTEMI (non-ST elevated myocardial infarction) [facility])    Principal Problem:    NSTEMI (non-ST elevated myocardial infarction) ([address] (5/8/2017)    Active Problems:    Pneumonia (5/8/2017)      Overdose opiate (5/8/2017)      Chronic pain (5/8/2017)        Assessment/Plan:  1. NSTEMI with pulmonary edema: had nonocclusive CAD Aug 2016 and very high LV filling pressures; this may be a variant of Takotsubo cardiomyopathy vs acute diastolic dysfunction vs progression of CAD/vulnerable plaque  2. Remains vented and sedated  3. All consultants help appreciated  Plan:  1. Echo to reassess LV function, wall motion  2. Continue heparin  3. Cath later this week     Subjective:  Sedated on vent       ALONZO Briones does not respond since he is sedated. Objective:     Visit Vitals    /68    Pulse (!) 55    Temp 97.1 °F (36.2 °C)    Resp 18    Wt 101.2 kg (223 lb 1.7 oz)    SpO2 97%        Physical Exam:  Neck: no JVD  Heart: regular rate and rhythm, S1, S2 normal, no murmur, click, rub or gallop  Lungs: clear to auscultation bilaterally, normal percussion bilaterally  Abdomen: soft  Extremities: extremities normal, atraumatic, no cyanosis or edema  Pulses: 2+ and symmetric  Neurologic: Mental status: sedated on vent      Labs:    Recent Labs      05/09/17   1109   TROIQ  0.08*     Recent Labs      05/10/17   0520   NA  137   K  4.4   CL  102   BUN  16   CREA  0.77   GLU  107*   PHOS  4.2   CA  8.5     Recent Labs      05/10/17   0520   WBC  8.0   HGB  12.1   HCT  35.8*   PLT  285     No results for input(s): TGL, CHOL, LDLC in the last 72 hours.     No lab exists for component: HDLC,  HBA1C    Telemetry: normal sinus rhythm     Data Review: current meds, labs,recent radiology, intake/output/weight and problem list reviewed

## 2017-05-10 NOTE — INTERDISCIPLINARY ROUNDS
IDR/SLIDR Summary          Patient: Noemi Duron MRN: [id number]    Age: 62 y.o. YOB: 1955 Room/Bed: [address] [place] [address]   Admit Diagnosis: nstemi  NSTEMI (non-ST elevated myocardial infarction) ([facility]  Principal Diagnosis: NSTEMI (non-ST elevated myocardial infarction) ([facility]   Goals: Extubate   Readmission: NO  Quality Measure: Not applicable  VTE Prophylaxis: Chemical  Influenza Vaccine screening completed? YES  Pneumococcal Vaccine screening completed? YES  Mobility needs: Yes   Nutrition plan:No  Consults:P.T, O.T. and Case Management    Financial concerns:No  Escalated to CM? NO  RRAT Score: 20   Interventions:Home Health and H2H  Testing due for pt today?  YES  LOS: 2 days Expected length of stay 7 days  Discharge plan: rehab   PCP: PROVIDER UNKNOWN  Transportation needs: Yes    Days before discharge:two or more days before discharge   Discharge disposition: Home    Signed:     Minerva Downs RN  5/10/2017  7:29 AM

## 2017-05-11 LAB
ACT BLD: 157 SECS (ref 79–138)
APTT PPP: 30.3 SEC (ref 22.1–32.5)
BACTERIA SPEC CULT: NORMAL
GRAM STN SPEC: NORMAL
SERVICE CMNT-IMP: NORMAL
THERAPEUTIC RANGE,PTTT: NORMAL SECS (ref 58–77)

## 2017-05-11 PROCEDURE — 4A023N8 MEASUREMENT OF CARDIAC SAMPLING AND PRESSURE, BILATERAL, PERCUTANEOUS APPROACH: ICD-10-PCS | Performed by: INTERNAL MEDICINE

## 2017-05-11 PROCEDURE — 74011000250 HC RX REV CODE- 250: Performed by: INTERNAL MEDICINE

## 2017-05-11 PROCEDURE — 93460 R&L HRT ART/VENTRICLE ANGIO: CPT

## 2017-05-11 PROCEDURE — 77030004533 HC CATH ANGI DX IMP BSC -B

## 2017-05-11 PROCEDURE — 76450000000

## 2017-05-11 PROCEDURE — 74011250636 HC RX REV CODE- 250/636: Performed by: FAMILY MEDICINE

## 2017-05-11 PROCEDURE — B2111ZZ FLUOROSCOPY OF MULTIPLE CORONARY ARTERIES USING LOW OSMOLAR CONTRAST: ICD-10-PCS | Performed by: INTERNAL MEDICINE

## 2017-05-11 PROCEDURE — C1894 INTRO/SHEATH, NON-LASER: HCPCS

## 2017-05-11 PROCEDURE — 94003 VENT MGMT INPAT SUBQ DAY: CPT

## 2017-05-11 PROCEDURE — 36415 COLL VENOUS BLD VENIPUNCTURE: CPT | Performed by: INTERNAL MEDICINE

## 2017-05-11 PROCEDURE — B2151ZZ FLUOROSCOPY OF LEFT HEART USING LOW OSMOLAR CONTRAST: ICD-10-PCS | Performed by: INTERNAL MEDICINE

## 2017-05-11 PROCEDURE — 74011250636 HC RX REV CODE- 250/636: Performed by: INTERNAL MEDICINE

## 2017-05-11 PROCEDURE — 85730 THROMBOPLASTIN TIME PARTIAL: CPT | Performed by: INTERNAL MEDICINE

## 2017-05-11 PROCEDURE — 74011636320 HC RX REV CODE- 636/320: Performed by: INTERNAL MEDICINE

## 2017-05-11 PROCEDURE — C1760 CLOSURE DEV, VASC: HCPCS

## 2017-05-11 PROCEDURE — 77030013744

## 2017-05-11 PROCEDURE — 77030013406 HC CATH CTRL EDWD -B

## 2017-05-11 PROCEDURE — 85347 COAGULATION TIME ACTIVATED: CPT

## 2017-05-11 PROCEDURE — 65620000000 HC RM CCU GENERAL

## 2017-05-11 PROCEDURE — 74011000258 HC RX REV CODE- 258: Performed by: FAMILY MEDICINE

## 2017-05-11 PROCEDURE — 74011250637 HC RX REV CODE- 250/637: Performed by: FAMILY MEDICINE

## 2017-05-11 PROCEDURE — C1751 CATH, INF, PER/CENT/MIDLINE: HCPCS

## 2017-05-11 PROCEDURE — 74011000258 HC RX REV CODE- 258: Performed by: INTERNAL MEDICINE

## 2017-05-11 RX ORDER — MIDAZOLAM HYDROCHLORIDE 1 MG/ML
.5-1 INJECTION, SOLUTION INTRAMUSCULAR; INTRAVENOUS
Status: DISCONTINUED | OUTPATIENT
Start: 2017-05-11 | End: 2017-05-11

## 2017-05-11 RX ORDER — SODIUM CHLORIDE 9 MG/ML
1.5 INJECTION, SOLUTION INTRAVENOUS CONTINUOUS
Status: DISCONTINUED | OUTPATIENT
Start: 2017-05-11 | End: 2017-05-11 | Stop reason: HOSPADM

## 2017-05-11 RX ORDER — DOBUTAMINE HYDROCHLORIDE 200 MG/100ML
2.5 INJECTION INTRAVENOUS CONTINUOUS
Status: DISPENSED | OUTPATIENT
Start: 2017-05-11 | End: 2017-05-12

## 2017-05-11 RX ORDER — HEPARIN SODIUM 5000 [USP'U]/ML
4000 INJECTION, SOLUTION INTRAVENOUS; SUBCUTANEOUS ONCE
Status: COMPLETED | OUTPATIENT
Start: 2017-05-11 | End: 2017-05-11

## 2017-05-11 RX ORDER — LIDOCAINE HYDROCHLORIDE 10 MG/ML
10-30 INJECTION INFILTRATION; PERINEURAL
Status: DISCONTINUED | OUTPATIENT
Start: 2017-05-11 | End: 2017-05-11

## 2017-05-11 RX ORDER — ATROPINE SULFATE 0.1 MG/ML
.5-1 INJECTION INTRAVENOUS AS NEEDED
Status: DISCONTINUED | OUTPATIENT
Start: 2017-05-11 | End: 2017-05-11

## 2017-05-11 RX ORDER — HEPARIN SODIUM 1000 [USP'U]/ML
1000-5000 INJECTION, SOLUTION INTRAVENOUS; SUBCUTANEOUS
Status: DISCONTINUED | OUTPATIENT
Start: 2017-05-11 | End: 2017-05-11

## 2017-05-11 RX ORDER — HEPARIN SODIUM 200 [USP'U]/100ML
2000 INJECTION, SOLUTION INTRAVENOUS AS NEEDED
Status: DISCONTINUED | OUTPATIENT
Start: 2017-05-11 | End: 2017-05-11

## 2017-05-11 RX ORDER — FENTANYL CITRATE 50 UG/ML
25-200 INJECTION, SOLUTION INTRAMUSCULAR; INTRAVENOUS
Status: DISCONTINUED | OUTPATIENT
Start: 2017-05-11 | End: 2017-05-11

## 2017-05-11 RX ORDER — SODIUM CHLORIDE 9 MG/ML
3 INJECTION, SOLUTION INTRAVENOUS CONTINUOUS
Status: DISCONTINUED | OUTPATIENT
Start: 2017-05-11 | End: 2017-05-11 | Stop reason: HOSPADM

## 2017-05-11 RX ORDER — SODIUM CHLORIDE 0.9 % (FLUSH) 0.9 %
5-10 SYRINGE (ML) INJECTION AS NEEDED
Status: DISCONTINUED | OUTPATIENT
Start: 2017-05-11 | End: 2017-05-11

## 2017-05-11 RX ADMIN — Medication 150 MCG/HR: at 03:47

## 2017-05-11 RX ADMIN — FUROSEMIDE 40 MG: 10 INJECTION, SOLUTION INTRAMUSCULAR; INTRAVENOUS at 18:58

## 2017-05-11 RX ADMIN — DEXMEDETOMIDINE HYDROCHLORIDE 1.4 MCG/KG/HR: 100 INJECTION, SOLUTION INTRAVENOUS at 11:11

## 2017-05-11 RX ADMIN — ATORVASTATIN CALCIUM 40 MG: 40 TABLET, FILM COATED ORAL at 18:58

## 2017-05-11 RX ADMIN — PIPERACILLIN SODIUM,TAZOBACTAM SODIUM 3.38 G: 3; .375 INJECTION, POWDER, FOR SOLUTION INTRAVENOUS at 15:36

## 2017-05-11 RX ADMIN — POLYVINYL ALCOHOL 2 DROP: 14 SOLUTION/ DROPS OPHTHALMIC at 15:41

## 2017-05-11 RX ADMIN — DEXMEDETOMIDINE HYDROCHLORIDE 1.4 MCG/KG/HR: 100 INJECTION, SOLUTION INTRAVENOUS at 14:06

## 2017-05-11 RX ADMIN — ASPIRIN 81 MG CHEWABLE TABLET 81 MG: 81 TABLET CHEWABLE at 08:06

## 2017-05-11 RX ADMIN — DIPHENHYDRAMINE HYDROCHLORIDE 25 MG: 50 INJECTION INTRAMUSCULAR; INTRAVENOUS at 13:42

## 2017-05-11 RX ADMIN — LIDOCAINE HYDROCHLORIDE 10 ML: 10 INJECTION, SOLUTION INFILTRATION; PERINEURAL at 14:14

## 2017-05-11 RX ADMIN — Medication 10 ML: at 05:17

## 2017-05-11 RX ADMIN — SODIUM CHLORIDE 3 ML/KG/HR: 900 INJECTION, SOLUTION INTRAVENOUS at 13:50

## 2017-05-11 RX ADMIN — POLYVINYL ALCOHOL 2 DROP: 14 SOLUTION/ DROPS OPHTHALMIC at 05:17

## 2017-05-11 RX ADMIN — DOBUTAMINE IN DEXTROSE 5 MCG/KG/MIN: 200 INJECTION, SOLUTION INTRAVENOUS at 15:36

## 2017-05-11 RX ADMIN — FUROSEMIDE 40 MG: 10 INJECTION, SOLUTION INTRAMUSCULAR; INTRAVENOUS at 08:07

## 2017-05-11 RX ADMIN — DEXMEDETOMIDINE HYDROCHLORIDE 1.4 MCG/KG/HR: 100 INJECTION, SOLUTION INTRAVENOUS at 16:48

## 2017-05-11 RX ADMIN — Medication 150 MCG/HR: at 21:47

## 2017-05-11 RX ADMIN — PIPERACILLIN SODIUM,TAZOBACTAM SODIUM 3.38 G: 3; .375 INJECTION, POWDER, FOR SOLUTION INTRAVENOUS at 22:04

## 2017-05-11 RX ADMIN — Medication 150 MCG/HR: at 15:43

## 2017-05-11 RX ADMIN — DEXMEDETOMIDINE HYDROCHLORIDE 1.4 MCG/KG/HR: 100 INJECTION, SOLUTION INTRAVENOUS at 05:14

## 2017-05-11 RX ADMIN — HEPARIN SODIUM 2000 UNITS: 200 INJECTION, SOLUTION INTRAVENOUS at 13:51

## 2017-05-11 RX ADMIN — CHLORHEXIDINE GLUCONATE 15 ML: 1.2 RINSE ORAL at 18:58

## 2017-05-11 RX ADMIN — DEXMEDETOMIDINE HYDROCHLORIDE 1.4 MCG/KG/HR: 100 INJECTION, SOLUTION INTRAVENOUS at 08:02

## 2017-05-11 RX ADMIN — HEPARIN SODIUM 4000 UNITS: 5000 INJECTION, SOLUTION INTRAVENOUS; SUBCUTANEOUS at 06:56

## 2017-05-11 RX ADMIN — CHLORHEXIDINE GLUCONATE 15 ML: 1.2 RINSE ORAL at 08:06

## 2017-05-11 RX ADMIN — Medication 150 MCG/HR: at 09:54

## 2017-05-11 RX ADMIN — IOPAMIDOL 96 ML: 755 INJECTION, SOLUTION INTRAVENOUS at 14:42

## 2017-05-11 RX ADMIN — FENTANYL CITRATE 100 MCG: 50 INJECTION, SOLUTION INTRAMUSCULAR; INTRAVENOUS at 08:51

## 2017-05-11 RX ADMIN — LEVOFLOXACIN 750 MG: 5 INJECTION, SOLUTION INTRAVENOUS at 22:04

## 2017-05-11 RX ADMIN — DEXMEDETOMIDINE HYDROCHLORIDE 1.4 MCG/KG/HR: 100 INJECTION, SOLUTION INTRAVENOUS at 02:00

## 2017-05-11 RX ADMIN — POLYVINYL ALCOHOL 2 DROP: 14 SOLUTION/ DROPS OPHTHALMIC at 22:23

## 2017-05-11 RX ADMIN — PIPERACILLIN SODIUM,TAZOBACTAM SODIUM 3.38 G: 3; .375 INJECTION, POWDER, FOR SOLUTION INTRAVENOUS at 05:15

## 2017-05-11 RX ADMIN — Medication 10 ML: at 22:04

## 2017-05-11 RX ADMIN — HYDROCORTISONE SODIUM SUCCINATE 100 MG: 100 INJECTION, POWDER, FOR SOLUTION INTRAMUSCULAR; INTRAVENOUS at 13:43

## 2017-05-11 NOTE — PROCEDURES
Cardiac Catheterization Procedure Note   Patient: Ivelisse Kelly  MRN: [id number]  SSN: [id number]   YOB: 1955 Age: 62 y.o.   Sex: male    Date of Procedure: 5/11/2017   Pre-procedure Diagnosis: Congestive Heart Failure, Coronary Artery Disease and NSTEMI  Post-procedure Diagnosis: Congestive Heart Failure and Coronary Artery Disease  Procedure: Left and Right Heart Cath  :  Dr. Estee Handley MD    Assistant(s):  None  Anesthesia: Moderate Sedation   Estimated Blood Loss: Less than 10 mL   Specimens Removed: None  Findings: mild to moderate nonocclusive CAD; severe LV systolic dysfunction, EF 17% with [id number] akinesis typical for transient apical ballooning; moderate pulmonary hypertension; elevated RV filling pressure, PCWP, LV filling pressure   Complications: None   Implants:  None  Signed by:  Estee Handley MD  5/11/2017  2:56 PM

## 2017-05-11 NOTE — PROGRESS NOTES
2000- Bedside and Verbal shift change report given to [address] (oncoming nurse) by Angel Corcoran (offgoing nurse). Report included the following information SBAR, Procedure Summary, Intake/Output, Recent Results and Cardiac Rhythm NSR/Sinus López Hannah. 2040- Precedex at 1.4mcg/kg/hr, Current max dose. Will adjust Fentanyl when necessary for comfort and sedation. 2144- Pt fentanyl increased from 100mcg/hr to 150mcg/hr. Patient combative. Kicked nurse twice, so increased fentanyl for sedation and comfort per order.

## 2017-05-11 NOTE — PROGRESS NOTES
Bedside and Verbal shift change report given to Marcos Xie [address] by [address] [facility]. Report included the following information SBAR, Kardex, MAR, Recent Results and Cardiac Rhythm SB/SR.    0900: MD wanted the VENANCIO protocol on hold for now. 1030: Pt combative and shaking his no when told about procedure. MD aware.     1300: Gave SBAR report to Mell Wayne RN from cath lab     TRANSFER - IN REPORT:    Verbal report received from Felicity Ibarra on ALONZO Briones  being received from cath lab(unit) for routine post - op      Report consisted of patients Situation, Background, Assessment and   Recommendations(SBAR). Information from the following report(s) SBAR, Kardex, Intake/Output and Recent Results was reviewed with the receiving nurse. Opportunity for questions and clarification was provided. Assessment completed upon patients arrival to unit and care assumed. 1600: Pt agitated and trying to extubate himself. Paged Dr Alejandra Pichardo to ask about extubation. 4 RNs at bedside to help with pt trying to climb out of bed. HR 130s /96 Pt face red and sweaty    1630: Pt extubated without difficulty. Pt appears much calmer     1930: Bedside and Verbal shift change report given to Joseline Lubin by Juliette Miramontes RN.  Report included the following information SBAR, Kardex, MAR, Recent Results and Cardiac Rhythm SR.
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
## 2017-05-11 NOTE — PROGRESS NOTES
TRANSFER - OUT REPORT:    Verbal report given to Zina Chandler RN(name) by Floyd Palmer RN on Mike Stair  being transferred to CCU(unit) for routine post - op       Report consisted of patients Situation, Background, Assessment and   Recommendations(SBAR). Information from the following report(s) SBAR, Procedure Summary and MAR was reviewed with the receiving nurse.     Lines:   Midline Catheter 42/33/06 Left;Basilic (Active)   Criteria for Appropriate Use Irritant/vesicant medication 5/11/2017  8:00 AM   Site Assessment Clean, dry, & intact 5/11/2017  8:00 AM   Phlebitis Assessment 0 5/11/2017  8:00 AM   Infiltration Assessment 0 5/11/2017  8:00 AM   Dressing Status Clean, dry, & intact 5/11/2017  8:00 AM   Dressing Type Transparent 5/11/2017  8:00 AM   Action Taken Open ports on tubing capped 5/10/2017  8:00 PM   Hub Color/Line Status Pink 5/11/2017  8:00 AM   Date of Last Dressing Change 05/09/17 5/11/2017  8:00 AM   Alcohol Cap Used Yes 5/11/2017  8:00 AM       Peripheral IV 05/08/17 Left Hand (Active)   Site Assessment Clean, dry, & intact 5/11/2017  8:00 AM   Phlebitis Assessment 0 5/11/2017  8:00 AM   Infiltration Assessment 0 5/11/2017  8:00 AM   Dressing Status Clean, dry, & intact 5/11/2017  8:00 AM   Dressing Type Transparent 5/11/2017  8:00 AM   Hub Color/Line Status Pink 5/11/2017  8:00 AM   Action Taken Open ports on tubing capped 5/10/2017  4:00 PM   Alcohol Cap Used Yes 5/11/2017  8:00 AM       Peripheral IV 05/08/17 Left Wrist (Active)   Site Assessment Clean, dry, & intact 5/11/2017  8:00 AM   Phlebitis Assessment 0 5/11/2017  8:00 AM   Infiltration Assessment 0 5/11/2017  8:00 AM   Dressing Status Clean, dry, & intact 5/11/2017  8:00 AM   Dressing Type Transparent 5/11/2017  8:00 AM   Hub Color/Line Status Pink 5/11/2017  8:00 AM   Action Taken Open ports on tubing capped 5/10/2017  8:00 PM   Alcohol Cap Used No 5/11/2017  8:00 AM       Peripheral IV 05/08/17 Right Forearm (Active)   Site Assessment Clean, dry, & intact 5/11/2017  8:00 AM   Phlebitis Assessment 0 5/11/2017  8:00 AM   Infiltration Assessment 0 5/11/2017  8:00 AM   Dressing Status Clean, dry, & intact 5/11/2017  8:00 AM   Dressing Type Transparent 5/11/2017  8:00 AM   Hub Color/Line Status Pink 5/11/2017  8:00 AM   Action Taken Open ports on tubing capped 5/10/2017  8:00 PM   Alcohol Cap Used Yes 5/11/2017  8:00 AM        Opportunity for questions and clarification was provided.       Patient transported with:   Monitor   RN x2  Respiratory  Vent

## 2017-05-11 NOTE — PROGRESS NOTES
CM reviewed case with treatment team during Bedside Interdisciplinary Rounds. Patient continues with vent and plan for cardiac cath today. Palliative to begin to follow. CM to continue to follow for discharge planning needs.
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
## 2017-05-11 NOTE — CONSULTS
PALLIATIVE MEDICINE              Consult noted and appreciated. Pt CAROLE at this time for a procedure. Will follow up in am due to family meetings scheduled this afternoon and inability to return to see. Vania Penny.  [address] MSN, FNP-BC, [facility]
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
## 2017-05-11 NOTE — PROGRESS NOTES
Problem: Non-Violent Restraints  Goal: *Removal from restraints as soon as assessed to be safe  Outcome: Progressing Towards Goal  q2hr

## 2017-05-11 NOTE — INTERDISCIPLINARY ROUNDS
IDR/SLIDR Summary          Patient: Linda Clayton MRN: [id number]    Age: 62 y.o. YOB: 1955 Room/Bed: [address] [place] [address]   Admit Diagnosis: nstemi  NSTEMI (non-ST elevated myocardial infarction) ([facility]  Principal Diagnosis: NSTEMI (non-ST elevated myocardial infarction) ([facility]   Goals: Extubate   Readmission: NO  Quality Measure: Not applicable  VTE Prophylaxis: Chemical  Influenza Vaccine screening completed? YES  Pneumococcal Vaccine screening completed? YES  Mobility needs: Yes   Nutrition plan:No  Consults:P.T, O.T. and Case Management    Financial concerns:No  Escalated to CM? NO  RRAT Score: 20   Interventions:Home Health and H2H  Testing due for pt today?  YES  LOS: 2 days Expected length of stay 7 days  Discharge plan: rehab   PCP: Ursula Goldstein  Transportation needs: Yes    Days before discharge:two or more days before discharge   Discharge disposition: Home    Signed:     Hernesto Paz RN  5/10/2017  7:29 AM

## 2017-05-12 ENCOUNTER — APPOINTMENT (OUTPATIENT)
Dept: GENERAL RADIOLOGY | Age: 62
DRG: 280 | End: 2017-05-12
Attending: INTERNAL MEDICINE
Payer: MEDICARE

## 2017-05-12 LAB
ANION GAP BLD CALC-SCNC: 12 MMOL/L (ref 5–15)
BASOPHILS # BLD AUTO: 0 K/UL (ref 0–0.1)
BASOPHILS # BLD: 0 % (ref 0–1)
BUN SERPL-MCNC: 14 MG/DL (ref 6–20)
BUN/CREAT SERPL: 18 (ref 12–20)
CALCIUM SERPL-MCNC: 8.7 MG/DL (ref 8.5–10.1)
CHLORIDE SERPL-SCNC: 96 MMOL/L (ref 97–108)
CO2 SERPL-SCNC: 27 MMOL/L (ref 21–32)
CREAT SERPL-MCNC: 0.8 MG/DL (ref 0.7–1.3)
EOSINOPHIL # BLD: 0.1 K/UL (ref 0–0.4)
EOSINOPHIL NFR BLD: 1 % (ref 0–7)
ERYTHROCYTE [DISTWIDTH] IN BLOOD BY AUTOMATED COUNT: 14.6 % (ref 11.5–14.5)
GLUCOSE SERPL-MCNC: 102 MG/DL (ref 65–100)
HCT VFR BLD AUTO: 36.6 % (ref 36.6–50.3)
HGB BLD-MCNC: 12.5 G/DL (ref 12.1–17)
LYMPHOCYTES # BLD AUTO: 12 % (ref 12–49)
LYMPHOCYTES # BLD: 1.3 K/UL (ref 0.8–3.5)
MAGNESIUM SERPL-MCNC: 1.9 MG/DL (ref 1.6–2.4)
MCH RBC QN AUTO: 30.3 PG (ref 26–34)
MCHC RBC AUTO-ENTMCNC: 34.2 G/DL (ref 30–36.5)
MCV RBC AUTO: 88.6 FL (ref 80–99)
MONOCYTES # BLD: 1.3 K/UL (ref 0–1)
MONOCYTES NFR BLD AUTO: 12 % (ref 5–13)
NEUTS SEG # BLD: 7.8 K/UL (ref 1.8–8)
NEUTS SEG NFR BLD AUTO: 75 % (ref 32–75)
PHOSPHATE SERPL-MCNC: 2.2 MG/DL (ref 2.6–4.7)
PLATELET # BLD AUTO: 304 K/UL (ref 150–400)
POTASSIUM SERPL-SCNC: 2.7 MMOL/L (ref 3.5–5.1)
RBC # BLD AUTO: 4.13 M/UL (ref 4.1–5.7)
SODIUM SERPL-SCNC: 135 MMOL/L (ref 136–145)
WBC # BLD AUTO: 10.4 K/UL (ref 4.1–11.1)

## 2017-05-12 PROCEDURE — 74011250637 HC RX REV CODE- 250/637: Performed by: NURSE PRACTITIONER

## 2017-05-12 PROCEDURE — 84100 ASSAY OF PHOSPHORUS: CPT | Performed by: INTERNAL MEDICINE

## 2017-05-12 PROCEDURE — 74011000258 HC RX REV CODE- 258: Performed by: FAMILY MEDICINE

## 2017-05-12 PROCEDURE — 71010 XR CHEST PORT: CPT

## 2017-05-12 PROCEDURE — 74011250636 HC RX REV CODE- 250/636: Performed by: INTERNAL MEDICINE

## 2017-05-12 PROCEDURE — 36415 COLL VENOUS BLD VENIPUNCTURE: CPT | Performed by: INTERNAL MEDICINE

## 2017-05-12 PROCEDURE — 74011250637 HC RX REV CODE- 250/637: Performed by: INTERNAL MEDICINE

## 2017-05-12 PROCEDURE — 65620000000 HC RM CCU GENERAL

## 2017-05-12 PROCEDURE — 74011250636 HC RX REV CODE- 250/636: Performed by: FAMILY MEDICINE

## 2017-05-12 PROCEDURE — 74011000258 HC RX REV CODE- 258: Performed by: INTERNAL MEDICINE

## 2017-05-12 PROCEDURE — 80048 BASIC METABOLIC PNL TOTAL CA: CPT | Performed by: INTERNAL MEDICINE

## 2017-05-12 PROCEDURE — 85025 COMPLETE CBC W/AUTO DIFF WBC: CPT | Performed by: INTERNAL MEDICINE

## 2017-05-12 PROCEDURE — 74011250636 HC RX REV CODE- 250/636: Performed by: NURSE PRACTITIONER

## 2017-05-12 PROCEDURE — 97161 PT EVAL LOW COMPLEX 20 MIN: CPT

## 2017-05-12 PROCEDURE — 83735 ASSAY OF MAGNESIUM: CPT | Performed by: INTERNAL MEDICINE

## 2017-05-12 PROCEDURE — 74011250637 HC RX REV CODE- 250/637: Performed by: PHYSICAL MEDICINE & REHABILITATION

## 2017-05-12 PROCEDURE — 97116 GAIT TRAINING THERAPY: CPT

## 2017-05-12 PROCEDURE — 74011250637 HC RX REV CODE- 250/637: Performed by: FAMILY MEDICINE

## 2017-05-12 PROCEDURE — 77010033678 HC OXYGEN DAILY

## 2017-05-12 PROCEDURE — 93306 TTE W/DOPPLER COMPLETE: CPT

## 2017-05-12 PROCEDURE — 74011250636 HC RX REV CODE- 250/636: Performed by: PHYSICAL MEDICINE & REHABILITATION

## 2017-05-12 PROCEDURE — 74011000250 HC RX REV CODE- 250: Performed by: INTERNAL MEDICINE

## 2017-05-12 RX ORDER — POTASSIUM CHLORIDE 750 MG/1
40 TABLET, FILM COATED, EXTENDED RELEASE ORAL 2 TIMES DAILY
Status: COMPLETED | OUTPATIENT
Start: 2017-05-12 | End: 2017-05-12

## 2017-05-12 RX ORDER — POTASSIUM CHLORIDE 20MEQ/15ML
40 LIQUID (ML) ORAL
Status: COMPLETED | OUTPATIENT
Start: 2017-05-12 | End: 2017-05-12

## 2017-05-12 RX ORDER — NALOXONE HYDROCHLORIDE 0.4 MG/ML
0.4 INJECTION, SOLUTION INTRAMUSCULAR; INTRAVENOUS; SUBCUTANEOUS AS NEEDED
Status: DISCONTINUED | OUTPATIENT
Start: 2017-05-12 | End: 2017-05-18 | Stop reason: HOSPADM

## 2017-05-12 RX ORDER — MORPHINE SULFATE 60 MG/1
60 TABLET, FILM COATED, EXTENDED RELEASE ORAL EVERY 12 HOURS
Status: DISCONTINUED | OUTPATIENT
Start: 2017-05-12 | End: 2017-05-15

## 2017-05-12 RX ORDER — SODIUM,POTASSIUM PHOSPHATES 280-250MG
2 POWDER IN PACKET (EA) ORAL 4 TIMES DAILY
Status: DISPENSED | OUTPATIENT
Start: 2017-05-12 | End: 2017-05-13

## 2017-05-12 RX ORDER — MORPHINE SULFATE 4 MG/ML
4 INJECTION, SOLUTION INTRAMUSCULAR; INTRAVENOUS ONCE
Status: COMPLETED | OUTPATIENT
Start: 2017-05-12 | End: 2017-05-12

## 2017-05-12 RX ORDER — ONDANSETRON 2 MG/ML
4 INJECTION INTRAMUSCULAR; INTRAVENOUS
Status: DISCONTINUED | OUTPATIENT
Start: 2017-05-12 | End: 2017-05-18 | Stop reason: HOSPADM

## 2017-05-12 RX ORDER — ONDANSETRON 2 MG/ML
4 INJECTION INTRAMUSCULAR; INTRAVENOUS ONCE
Status: COMPLETED | OUTPATIENT
Start: 2017-05-12 | End: 2017-05-12

## 2017-05-12 RX ORDER — MORPHINE SULFATE 5 MG/ML
INJECTION, SOLUTION INTRAVENOUS CONTINUOUS
Status: DISCONTINUED | OUTPATIENT
Start: 2017-05-12 | End: 2017-05-13

## 2017-05-12 RX ADMIN — Medication: at 13:37

## 2017-05-12 RX ADMIN — DEXMEDETOMIDINE HYDROCHLORIDE 0.5 MCG/KG/HR: 100 INJECTION, SOLUTION INTRAVENOUS at 12:45

## 2017-05-12 RX ADMIN — PIPERACILLIN SODIUM,TAZOBACTAM SODIUM 3.38 G: 3; .375 INJECTION, POWDER, FOR SOLUTION INTRAVENOUS at 13:42

## 2017-05-12 RX ADMIN — Medication 150 MCG/HR: at 10:04

## 2017-05-12 RX ADMIN — DEXMEDETOMIDINE HYDROCHLORIDE 1.4 MCG/KG/HR: 100 INJECTION, SOLUTION INTRAVENOUS at 10:37

## 2017-05-12 RX ADMIN — ASPIRIN 81 MG CHEWABLE TABLET 81 MG: 81 TABLET CHEWABLE at 09:26

## 2017-05-12 RX ADMIN — POTASSIUM CHLORIDE 40 MEQ: 40 SOLUTION ORAL at 05:42

## 2017-05-12 RX ADMIN — DOBUTAMINE IN DEXTROSE 2.5 MCG/KG/MIN: 200 INJECTION, SOLUTION INTRAVENOUS at 08:55

## 2017-05-12 RX ADMIN — POTASSIUM CHLORIDE 40 MEQ: 750 TABLET, FILM COATED, EXTENDED RELEASE ORAL at 19:19

## 2017-05-12 RX ADMIN — MORPHINE SULFATE 60 MG: 60 TABLET, FILM COATED, EXTENDED RELEASE ORAL at 22:45

## 2017-05-12 RX ADMIN — POTASSIUM & SODIUM PHOSPHATES POWDER PACK 280-160-250 MG 2 PACKET: 280-160-250 PACK at 09:26

## 2017-05-12 RX ADMIN — POTASSIUM CHLORIDE 40 MEQ: 750 TABLET, FILM COATED, EXTENDED RELEASE ORAL at 09:26

## 2017-05-12 RX ADMIN — ATORVASTATIN CALCIUM 40 MG: 40 TABLET, FILM COATED ORAL at 19:20

## 2017-05-12 RX ADMIN — FUROSEMIDE 40 MG: 10 INJECTION, SOLUTION INTRAMUSCULAR; INTRAVENOUS at 09:26

## 2017-05-12 RX ADMIN — ONDANSETRON 4 MG: 2 INJECTION INTRAMUSCULAR; INTRAVENOUS at 16:30

## 2017-05-12 RX ADMIN — DEXMEDETOMIDINE HYDROCHLORIDE 1.4 MCG/KG/HR: 100 INJECTION, SOLUTION INTRAVENOUS at 05:42

## 2017-05-12 RX ADMIN — PIPERACILLIN SODIUM,TAZOBACTAM SODIUM 3.38 G: 3; .375 INJECTION, POWDER, FOR SOLUTION INTRAVENOUS at 05:43

## 2017-05-12 RX ADMIN — POTASSIUM & SODIUM PHOSPHATES POWDER PACK 280-160-250 MG 2 PACKET: 280-160-250 PACK at 13:43

## 2017-05-12 RX ADMIN — POLYVINYL ALCOHOL 2 DROP: 14 SOLUTION/ DROPS OPHTHALMIC at 05:53

## 2017-05-12 RX ADMIN — Medication 4 MG: at 04:32

## 2017-05-12 RX ADMIN — Medication 10 ML: at 22:45

## 2017-05-12 RX ADMIN — Medication 150 MCG/HR: at 03:59

## 2017-05-12 RX ADMIN — DOBUTAMINE IN DEXTROSE 5 MCG/KG/MIN: 200 INJECTION, SOLUTION INTRAVENOUS at 06:57

## 2017-05-12 RX ADMIN — MORPHINE SULFATE 60 MG: 60 TABLET, FILM COATED, EXTENDED RELEASE ORAL at 15:15

## 2017-05-12 RX ADMIN — DEXMEDETOMIDINE HYDROCHLORIDE 1.4 MCG/KG/HR: 100 INJECTION, SOLUTION INTRAVENOUS at 02:01

## 2017-05-12 RX ADMIN — POTASSIUM CHLORIDE 40 MEQ: 40 SOLUTION ORAL at 09:26

## 2017-05-12 RX ADMIN — FUROSEMIDE 40 MG: 10 INJECTION, SOLUTION INTRAMUSCULAR; INTRAVENOUS at 19:19

## 2017-05-12 RX ADMIN — DEXMEDETOMIDINE HYDROCHLORIDE 1.4 MCG/KG/HR: 100 INJECTION, SOLUTION INTRAVENOUS at 07:32

## 2017-05-12 RX ADMIN — POTASSIUM & SODIUM PHOSPHATES POWDER PACK 280-160-250 MG 2 PACKET: 280-160-250 PACK at 22:45

## 2017-05-12 RX ADMIN — PIPERACILLIN SODIUM,TAZOBACTAM SODIUM 3.38 G: 3; .375 INJECTION, POWDER, FOR SOLUTION INTRAVENOUS at 22:45

## 2017-05-12 NOTE — CDMP QUERY
Please clarify if this patient is being treated/managed for:    =>Hypokalemia in the setting of lasix requiring supplemental potassium  =>Other Explanation of clinical findings  =>Unable to Determine (no explanation of clinical findings)    The medical record reflects the following clinical findings, treatment, and risk factors:    Risk Factors: lasix  Clinical Indicators: KCl 2.7  Treatment: supplemental potassium    Please clarify and document your clinical opinion in the progress notes and discharge summary including the definitive and/or presumptive diagnosis, (suspected or probable), related to the above clinical findings. Please include clinical findings supporting your diagnosis.     Thank you,         Juli Fernandez [address] [place] [address]

## 2017-05-12 NOTE — PROGRESS NOTES
5/12/2017     Admit Date: 5/8/2017    Admit Diagnosis: nstemi  NSTEMI (non-ST elevated myocardial infarction) [facility])    Principal Problem:    NSTEMI (non-ST elevated myocardial infarction) ([address] (5/8/2017)    Active Problems:    Pneumonia (5/8/2017)      Overdose opiate (5/8/2017)      Chronic pain (5/8/2017)        Assessment/Plan:  1. No complaints of chest pain or dyspnea. CHF symptoms are better and clinically his pulmonary edema is resolved  2. Presentation and course consistent with Takotsubo cardiomyopathy and would expect rapid improvement in his ejection fraction  3. Has altered mental status with some delirium  Plan:  1. Will wean dobutamine off   2. Increase activity as tolerated  3. Transfer to telemetry when off Precedex     Subjective:  Feels well        Nando Devine denies chest pain, chest pressure/discomfort, dyspnea, orthopnea, paroxysmal nocturnal dyspnea. Objective:     Visit Vitals    /66    Pulse 89    Temp 98.7 °F (37.1 °C)    Resp 21    Wt 99.4 kg (219 lb 3.2 oz)    SpO2 92%        Physical Exam:  Neck: supple, symmetrical, trachea midline, no adenopathy, thyroid: not enlarged, symmetric, no tenderness/mass/nodules, no carotid bruit and no JVD  Heart: regular rate and rhythm, S1, S2 normal, S3 present, no rub  Lungs: clear to auscultation bilaterally, normal percussion bilaterally  Abdomen: soft, non-tender.  Bowel sounds normal. No masses,  no organomegaly  Extremities: extremities normal, atraumatic, no cyanosis or edema  Pulses: 2+ and symmetric  Neurologic: Mental status: alertness: alert, orientation: place, affect: anxious      Labs:    Recent Labs      05/10/17   0527  05/09/17   1109   CPK  98   --    TROIQ   --   0.08*     Recent Labs      05/12/17   0415   NA  135*   K  2.7*   CL  96*   BUN  14   CREA  0.80   GLU  102*   PHOS  2.2*   CA  8.7     Recent Labs      05/12/17   0415   WBC  10.4   HGB  12.5   HCT  36.6   PLT  304     No results for input(s): TGL, CHOL, LDLC in the last 72 hours.     No lab exists for component: HDLC,  HBA1C    Telemetry: normal sinus rhythm     Data Review: current meds, labs,recent radiology, intake/output/weight and problem list reviewed

## 2017-05-12 NOTE — PROGRESS NOTES
1930: SBAR received from Meño Pederson. 2000: Patient alert and restless. VSS. Patient O2 100% on 5L NC. Decreased O2 to 3L NC. Patient tolerating well. O2 98%. 2300: Patient on room air. O2 97%. Will continue to monitor. 9969: Patient very agitated, yelling profanity at nurses, being physically aggressive. Paged NP. New orders received for morphine and restraints. 9262: Critical potassium called of 2.7. New orders received. 0730: Bedside and Verbal shift change report given to Oswaldo Garrett RN (oncoming nurse) by Macho Faust (offgoing nurse). Report included the following information SBAR, Kardex, Intake/Output, MAR and Recent Results.

## 2017-05-12 NOTE — PROGRESS NOTES
Problem: Mobility Impaired (Adult and Pediatric)  Goal: *Acute Goals and Plan of Care (Insert Text)  Physical Therapy Goals  Initiated 5/12/2017  1. Patient will move from supine to sit and sit to supine in bed with modified independence within 7 day(s). 2. Patient will transfer from bed to chair and chair to bed with modified independence using the least restrictive device within 7 day(s). 3. Patient will perform sit to stand with modified independence within 7 day(s). 4. Patient will ambulate with modified independence for 250 feet with the least restrictive device within 7 day(s). 5. Patient will ascend/descend 4 stairs with 2 handrail(s) with modified independence within 7 day(s). PHYSICAL THERAPY EVALUATION  Patient: Pilo Andrews (58 y.o. male)  Date: 5/12/2017  Primary Diagnosis: nstemi  NSTEMI (non-ST elevated myocardial infarction) [facility])        Precautions: FALL        ASSESSMENT :  Based on the objective data described below, the patient presents with decreased mobility and very poor safety awareness after being admitted with NSTEMI and opiate overdose and pneumonia. He was extubated yesterday and now alert and following commands. EF 30-35% at this time. He is oriented, but was completely distracted by the visitors to the room next to his, insisting he knew them and attempting to leave the room to visit them. It was difficult to redirect and assure him that he did not, in fact, know the visitors. He states he has a walker at home, multiple walkers, in fact, and uses them at times. He lives in a one story home with his wife and both are retired. Patient was able to move to EOB and then stand and walk a short distance with a RW to the commode and back with mod assist for balance and safe use of the walker. He tends to push the walker too far forward. His VS were stable with HR max 117 with activity.   Recommend we continue gait and balance training to ensure safe transition home with family once medically ready. Patient will benefit from skilled intervention to address the above impairments. Patients rehabilitation potential is considered to be Good  Factors which may influence rehabilitation potential include:   [ ]         None noted  [ ]         Mental ability/status  [ ]         Medical condition  [ ]         Home/family situation and support systems  [X]         Safety awareness  [ ]         Pain tolerance/management  [ ]         Other:        PLAN :  Recommendations and Planned Interventions:  [X]           Bed Mobility Training             [ ]    Neuromuscular Re-Education  [X]           Transfer Training                   [ ]    Orthotic/Prosthetic Training  [X]           Gait Training                         [ ]    Modalities  [X]           Therapeutic Exercises           [ ]    Edema Management/Control  [X]           Therapeutic Activities            [X]    Patient and Family Training/Education  [ ]           Other (comment):     Frequency/Duration: Patient will be followed by physical therapy  5 times a week to address goals. Discharge Recommendations: To Be Determined  Further Equipment Recommendations for Discharge: none at this time       SUBJECTIVE:   Patient stated I think I need to use the bathroom again.       OBJECTIVE DATA SUMMARY:   HISTORY:    No past medical history on file. No past surgical history on file.   Prior Level of Function/Home Situation: independent at home and caring for himself  Personal factors and/or comorbidities impacting plan of care: confusion, opiate dependence, NSTEMI, EF 30-35%     Home Situation  Home Environment: Private residence  # Steps to Enter: 4  One/Two Story Residence: Two story  # of Interior Steps: 0  Height of Each Step (in): 0 inches  Interior Rails: Both  Lift Chair Available: No  Living Alone: No  Support Systems: Spouse/Significant Other/Partner  Patient Expects to be Discharged to[de-identified] Rehabilitation facility  Current DME Used/Available at Home: [place], straight, Walker, rolling     EXAMINATION/PRESENTATION/DECISION MAKING:   Critical Behavior:  Neurologic State: Agitated, Confused  Orientation Level: Disoriented to person, Disoriented to place, Disoriented to situation  Cognition: Follows commands     Hearing: Auditory  Auditory Impairment: Deaf (in one ear/wife doesn't know which one)  Skin:  intact  Edema: moderate in LE's  Range Of Motion:  AROM: Within functional limits                       Strength:    Strength: Within functional limits                    Tone & Sensation:   Tone: Normal              Sensation:  (numbness R index finger)               Coordination:  Coordination: Within functional limits  Vision:      Functional Mobility:  Bed Mobility:  Rolling: Modified independent  Supine to Sit: Stand-by asssistance  Sit to Supine: Stand-by asssistance     Transfers:  Sit to Stand: Minimum assistance; Adaptive equipment; Additional time  Stand to Sit: Minimum assistance; Adaptive equipment; Additional time                       Balance:   Sitting: Intact; Without support  Standing: Impaired; With support (needs hands on the walker)  Standing - Static: Fair (with hands on the walker)  Standing - Dynamic : Poor  Ambulation/Gait Training:  Distance (ft): 10 Feet (ft)  Assistive Device: Gait belt;Walker, rolling  Ambulation - Level of Assistance: Moderate assistance; Additional time; Adaptive equipment;Assist x1 (for redirection and safe walker use)     Gait Description (WDL): Exceptions to WDL  Gait Abnormalities: Decreased step clearance; Path deviations;Trunk sway increased        Base of Support: Narrowed     Speed/Velvet: Slow;Shuffled  Step Length: Right shortened;Left shortened        Interventions: Safety awareness training; Tactile cues; Verbal cues                    Very poor safety awareness with the walker              Stairs:                          Functional Measure:  Barthel Index:      Bathin  Bladder: 0  Bowels: 10  Groomin  Dressin  Feeding: 10  Mobility: 0  Stairs: 0  Toilet Use: 5  Transfer (Bed to Chair and Back): 10  Total: 50         Barthel and G-code impairment scale:  Percentage of impairment CH  0% CI  1-19% CJ  20-39% CK  40-59% CL  60-79% CM  80-99% CN  100%   Barthel Score 0-100 100 99-80 79-60 59-40 20-39 1-19    0   Barthel Score 0-20 20 17-19 13-16 9-12 5-8 1-4 0      The Barthel ADL Index: Guidelines  1. The index should be used as a record of what a patient does, not as a record of what a patient could do. 2. The main aim is to establish degree of independence from any help, physical or verbal, however minor and for whatever reason. 3. The need for supervision renders the patient not independent. 4. A patient's performance should be established using the best available evidence. Asking the patient, friends/relatives and nurses are the usual sources, but direct observation and common sense are also important. However direct testing is not needed. 5. Usually the patient's performance over the preceding 24-48 hours is important, but occasionally longer periods will be relevant. 6. Middle categories imply that the patient supplies over 50 per cent of the effort. 7. Use of aids to be independent is allowed. Wil Harrison., Barthel, D.W. ([id number]). Functional evaluation: the Barthel Index. [address] [facility] (14)2. [facility] kyree FrancineSaint [facility], J.J.M.F, Oralia Montes [facility]., [place], [address] [place] [address] (). Measuring the change indisability after inpatient rehabilitation; comparison of the responsiveness of the Barthel Index and Functional Dumas Measure. Journal of Neurology, Neurosurgery, and Psychiatry, 66(4), 348-308. Milana Nobles, N.J.A, Elmo Dasilva  WAndreaJ.M, & Sendy Ovalle MAndreaA. (2004.) Assessment of post-stroke quality of life in cost-effectiveness studies: The usefulness of the Barthel Index and the EuroQoL-5D. Quality of Life Research, 13, 001-85         G codes:   In compliance with CMSs Claims Based Outcome Reporting, the following G-code set was chosen for this patient based on their primary functional limitation being treated: The outcome measure chosen to determine the severity of the functional limitation was the Tinetti with a score of 50/100 which was correlated with the impairment scale. · Mobility - Walking and Moving Around:               - CURRENT STATUS:    CK - 40%-59% impaired, limited or restricted               - GOAL STATUS:           CI - 1%-19% impaired, limited or restricted               - D/C STATUS:                       ---------------To be determined---------------      Physical Therapy Evaluation Charge Determination   History Examination Presentation Decision-Making   HIGH Complexity :3+ comorbidities / personal factors will impact the outcome/ POC  MEDIUM Complexity : 3 Standardized tests and measures addressing body structure, function, activity limitation and / or participation in recreation  MEDIUM Complexity : Evolving with changing characteristics  LOW Complexity : FOTO score of       Based on the above components, the patient evaluation is determined to be of the following complexity level: LOW      Pain:  Pain Scale 1: Visual  Pain Intensity 1: 0           Pain Intervention(s) 1: Medication (see MAR)  Activity Tolerance:   Fair  Please refer to the flowsheet for vital signs taken during this treatment. After treatment:   [ ]         Patient left in no apparent distress sitting up in chair  [X]         Patient left in no apparent distress in bed  [X]         Call bell left within reach  [X]         Nursing notified  [ ]         Caregiver present  [ ]         Bed alarm activated      COMMUNICATION/EDUCATION:   The patients plan of care was discussed with: Registered Nurse.  [X]         Fall prevention education was provided and the patient/caregiver indicated understanding.   [X]         Patient/family have participated as able in goal setting and plan of care. [X]         Patient/family agree to work toward stated goals and plan of care. [ ]         Patient understands intent and goals of therapy, but is neutral about his/her participation. [ ]         Patient is unable to participate in goal setting and plan of care.      Thank you for this referral.  Genna Do, PT   Time Calculation: 20 mins
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
## 2017-05-12 NOTE — PROGRESS NOTES
1530 Bedside shift change report given to Yana Littlejohn (oncoming nurse) by Syed Bravo (offgoing nurse). Report included the following information SBAR, Kardex, Procedure Summary, Intake/Output, MAR, Recent Results and Cardiac Rhythm NSR // ST.    1545 Pt complaining of nausea. Stated he just vomited but got everything with Chris Glow. Given 4 mg IV zofran. 1730 Pt has vomited 2x more since receiving zofran. 1800 Vomited again. Withholding liquids for now. 2000 Pt is restless and hostile, but able to reorient. 18 Wife updated over phone. Pt asking her to bring cervical collar to hospital. Able to keep liquids and pills down. 0000 Tremors noticeable in upper extremities. Pt denies EtOH use.  0500 Pt HR into 140s attempting to walk to toilet. Opted for bsc instead. Pt's O2 sats dropping into mid 80s with cyanosis notable on lips. Pt did not feel unsteady or different. Placed on 2L NC. Breath sounds clear bilaterally. Pt complaining of nausea and vomiting. Zofran given. Upon returning to bed, HR down to 110s. Pt having visual hallucinations. 0630 Pt seems more agitated. Has not slept overnight. 0730 Bedside shift change report given to Telma Moran (oncoming nurse) by Yana Littlejohn (offgoing nurse).  Report included the following information SBAR, Kardex, Procedure Summary, Intake/Output, MAR, Recent Results and Cardiac Rhythm ST.
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
## 2017-05-12 NOTE — PROGRESS NOTES
Problem: Falls - Risk of  Goal: *Absence of falls  Outcome: Progressing Towards Goal  Variance: Patient Condition  Comments: Very shaky on his feet

## 2017-05-12 NOTE — PROGRESS NOTES
Bedside shift change report given to Jasvir Baird rn (oncoming nurse) by Naif Robin RN (offgoing nurse). Report included the following information SBAR, Kardex, Procedure Summary, Intake/Output, Recent Results, Med Rec Status and Cardiac Rhythm NSR.     1300 changes made to patient's meds- fentanyl PCA d/c'd- Morphine PCA started  precedex discontinued. MScontin ordered. 1400 fentanyl PCA and precedex d/c'd and morphine PCA started at this time    1445 Dobutamine d/c'd at this time.

## 2017-05-12 NOTE — INTERDISCIPLINARY ROUNDS
IDR/SLIDR Summary          Patient: Rocio Waldrop MRN: [id number]    Age: 62 y.o. YOB: 1955 Room/Bed: [address] [place] [address]   Admit Diagnosis: nstemi  NSTEMI (non-ST elevated myocardial infarction) ([facility]  Principal Diagnosis: NSTEMI (non-ST elevated myocardial infarction) ([facility]   Goals: Wean Precedex and fentanyl   Readmission: NO  Quality Measure: Not applicable  VTE Prophylaxis: Chemical  Influenza Vaccine screening completed? YES  Pneumococcal Vaccine screening completed? YES  Mobility needs: Yes   Nutrition plan:No  Consults:P.T, O.T. and Case Management    Financial concerns:No  Escalated to CM? NO  RRAT Score: 20   Interventions:Home Health and H2H  Testing due for pt today?  YES  LOS: 4 days Expected length of stay 7 days  Discharge plan: rehab   PCP: [facility]  Transportation needs: Yes    Days before discharge:two or more days before discharge   Discharge disposition: Home    Signed:     Jahaira Ferguson RN  5/12/2017  7:29 AM

## 2017-05-12 NOTE — PROGRESS NOTES
Problem: Pressure Injury - Risk of  Goal: *Prevention of pressure ulcer  Outcome: Progressing Towards Goal  Patient repositioned every two hours.
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
## 2017-05-13 ENCOUNTER — APPOINTMENT (OUTPATIENT)
Dept: GENERAL RADIOLOGY | Age: 62
DRG: 280 | End: 2017-05-13
Attending: INTERNAL MEDICINE
Payer: MEDICARE

## 2017-05-13 PROCEDURE — 71020 XR CHEST PA LAT: CPT

## 2017-05-13 PROCEDURE — 74011250637 HC RX REV CODE- 250/637: Performed by: INTERNAL MEDICINE

## 2017-05-13 PROCEDURE — 65660000001 HC RM ICU INTERMED STEPDOWN

## 2017-05-13 PROCEDURE — 74011000258 HC RX REV CODE- 258: Performed by: FAMILY MEDICINE

## 2017-05-13 PROCEDURE — 74011250636 HC RX REV CODE- 250/636: Performed by: INTERNAL MEDICINE

## 2017-05-13 PROCEDURE — 51798 US URINE CAPACITY MEASURE: CPT

## 2017-05-13 PROCEDURE — 74011250636 HC RX REV CODE- 250/636: Performed by: FAMILY MEDICINE

## 2017-05-13 PROCEDURE — 74011250637 HC RX REV CODE- 250/637: Performed by: FAMILY MEDICINE

## 2017-05-13 PROCEDURE — 74011250637 HC RX REV CODE- 250/637: Performed by: PHYSICAL MEDICINE & REHABILITATION

## 2017-05-13 RX ORDER — ATENOLOL 25 MG/1
25 TABLET ORAL DAILY
Status: DISCONTINUED | OUTPATIENT
Start: 2017-05-13 | End: 2017-05-15

## 2017-05-13 RX ORDER — ENOXAPARIN SODIUM 100 MG/ML
40 INJECTION SUBCUTANEOUS EVERY 24 HOURS
Status: DISCONTINUED | OUTPATIENT
Start: 2017-05-13 | End: 2017-05-18 | Stop reason: HOSPADM

## 2017-05-13 RX ORDER — FUROSEMIDE 40 MG/1
40 TABLET ORAL DAILY
Status: DISCONTINUED | OUTPATIENT
Start: 2017-05-13 | End: 2017-05-18 | Stop reason: HOSPADM

## 2017-05-13 RX ADMIN — ONDANSETRON 4 MG: 2 INJECTION INTRAMUSCULAR; INTRAVENOUS at 12:57

## 2017-05-13 RX ADMIN — FUROSEMIDE 40 MG: 10 INJECTION, SOLUTION INTRAMUSCULAR; INTRAVENOUS at 08:25

## 2017-05-13 RX ADMIN — ASPIRIN 81 MG CHEWABLE TABLET 81 MG: 81 TABLET CHEWABLE at 08:23

## 2017-05-13 RX ADMIN — MORPHINE SULFATE 60 MG: 60 TABLET, FILM COATED, EXTENDED RELEASE ORAL at 20:45

## 2017-05-13 RX ADMIN — ATENOLOL 25 MG: 25 TABLET ORAL at 09:40

## 2017-05-13 RX ADMIN — ENOXAPARIN SODIUM 40 MG: 40 INJECTION SUBCUTANEOUS at 17:16

## 2017-05-13 RX ADMIN — ONDANSETRON 4 MG: 2 INJECTION INTRAMUSCULAR; INTRAVENOUS at 05:00

## 2017-05-13 RX ADMIN — Medication 10 ML: at 14:02

## 2017-05-13 RX ADMIN — ATORVASTATIN CALCIUM 40 MG: 40 TABLET, FILM COATED ORAL at 17:16

## 2017-05-13 RX ADMIN — Medication 10 ML: at 22:00

## 2017-05-13 RX ADMIN — PIPERACILLIN SODIUM,TAZOBACTAM SODIUM 3.38 G: 3; .375 INJECTION, POWDER, FOR SOLUTION INTRAVENOUS at 21:00

## 2017-05-13 RX ADMIN — Medication 1 CAPSULE: at 08:23

## 2017-05-13 RX ADMIN — Medication 10 ML: at 05:00

## 2017-05-13 RX ADMIN — PIPERACILLIN SODIUM,TAZOBACTAM SODIUM 3.38 G: 3; .375 INJECTION, POWDER, FOR SOLUTION INTRAVENOUS at 14:00

## 2017-05-13 RX ADMIN — MORPHINE SULFATE 60 MG: 60 TABLET, FILM COATED, EXTENDED RELEASE ORAL at 08:24

## 2017-05-13 RX ADMIN — PIPERACILLIN SODIUM,TAZOBACTAM SODIUM 3.38 G: 3; .375 INJECTION, POWDER, FOR SOLUTION INTRAVENOUS at 05:35
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
## 2017-05-13 NOTE — PROGRESS NOTES
0730- Report obtained from [facility]. 0800- Patient relaxed and feeling well. Denies pain. Has not used the PCA for a while. AM medications given. Appears Alert and Oriented. Call from wife. 1100- Patient up to the chair with one assist. Tolerated very well. Seems strong and has good balance. 1300- Patient back to bed after having some nausea post meal. Zofran given. 1345- Patient feeling better Ginger Ale given periodically for comfort. 1600- Patient has not used the PCA pump today. Denies pain at this time. PCA discontinued by the physician and removed from the bedside. Patient agrees with plan of care. 1715- Patient has not voided since removal of spence catheter. Does not feel the urge to void at this time. Bladder scan shows about 120 mL's of fluid. 1800- Completed dinner. Sitting up in the chair. Feeling well. 1920- Patient transported down and back to [facility]. Uneventful trip. 1930- Report given to [facility].

## 2017-05-13 NOTE — PROGRESS NOTES
5/13/2017     Admit Date: 5/8/2017    Admit Diagnosis: nstemi  NSTEMI (non-ST elevated myocardial infarction) [facility])    Principal Problem:    NSTEMI (non-ST elevated myocardial infarction) ([address] (5/8/2017)    Active Problems:    Pneumonia (5/8/2017)      Overdose opiate (5/8/2017)      Chronic pain (5/8/2017)        Assessment/Plan:  1. Transient apical ballooning with rapidly improving ejection fraction  2. Stable moderate CAD  3. AMS: improving  4. Hemodynamically stable post withdrawal of dobutamine  5. Sinus tachycardia from BB withdrawal  Plan:  1. Transfer to stepdown  2. Palliative care is managing his opioid dependence  3. Home early next week  4. Resume BB     Subjective:  Feels better       ALONZO PEARCEAndrea Anselmo denies chest pain, chest pressure/discomfort. Objective:     Visit Vitals    /88    Pulse (!) 116    Temp 98.4 °F (36.9 °C)    Resp 21    Wt 96.1 kg (211 lb 12.8 oz)    SpO2 90%        Physical Exam:  Neck: supple, symmetrical, trachea midline, no adenopathy, thyroid: not enlarged, symmetric, no tenderness/mass/nodules, no carotid bruit and no JVD  Heart: prominent apical impulse, regular rate and rhythm, S1, S2 normal  Lungs: clear to auscultation bilaterally, normal percussion bilaterally  Abdomen: soft, non-tender. Bowel sounds normal. No masses,  no organomegaly  Extremities: extremities normal, atraumatic, no cyanosis or edema  Pulses: 2+ and symmetric  Neurologic: Grossly normal      Labs:    No results for input(s): CPK, CKMB, TROIQ in the last 72 hours. No lab exists for component: CKQMB, CPKMB  Recent Labs      05/12/17   0415   NA  135*   K  2.7*   CL  96*   BUN  14   CREA  0.80   GLU  102*   PHOS  2.2*   CA  8.7     Recent Labs      05/12/17   0415   WBC  10.4   HGB  12.5   HCT  36.6   PLT  304     No results for input(s): TGL, CHOL, LDLC in the last 72 hours.     No lab exists for component: HDLC,  HBA1C    Telemetry: sinus tachycardia     Data Review: current meds, labs,recent radiology, intake/output/weight and problem list reviewed

## 2017-05-13 NOTE — PROGRESS NOTES
Infectious Disease Coverage Note    Admit Date: 2017  Deborah Harrison is a 62 y.o.male With h/o HTN, chronic pain syndrome, opioid dependence initially presented to [address] [place] [address] on 5/3 with fatigue, subjective fever. Temp of 99.4 in the ED. He was drowsy and was given Narcn which made him combative for which he was given Fentanyl 100mg and morphine and ativan and he became hypoxic, hypercarbic and was intubated. He was treated with levaquin for a rt lower lobe infiltrate and for enterococcus in the urine . He was transferred to [facility] for cardiac cath. Subjective:   Feels fine, up in chair    Review of Systems:  Constitutional: negative for fevers and chills  Ears, Nose, Mouth, Throat, and Face: negative  Respiratory: negative for cough or sputum  Cardiovascular: negative for chest pain  Gastrointestinal: negative for nausea and vomiting  Genitourinary:negative  Musculoskeletal:negative for stiff joints and back pain    Objective:   Blood pressure 136/89, pulse 82, temperature 98.5 °F (36.9 °C), resp. rate 20, weight 96.1 kg (211 lb 12.8 oz), SpO2 95 %. Temp (24hrs), Av.4 °F (36.9 °C), Min:98.1 °F (36.7 °C), Max:98.5 °F (36.9 °C)      Physical Exam: General:  alert, cooperative, no distress, appears stated age  Neck:  no erythema or exudates noted. Lungs:  Few crackles noted  Heart:  regular rate and rhythm  Abdomen:  soft, non-tender.  Bowel sounds normal. No masses,  no organomegaly    Data Review:   CBC:   Recent Labs      17   0415   WBC  10.4   RBC  4.13   HGB  12.5   HCT  36.6   PLT  304   GRANS  75   LYMPH  12   EOS  1     CMP:   Recent Labs      17   0415   GLU  102*   NA  135*   K  2.7*   CL  96*   CO2  27   BUN  14   CREA  0.80   CA  8.7   AGAP  12   BUCR  18       Microbiology:     No results found for: LIZETTE  Lab Results   Component Value Date/Time    Culture result: NO GROWTH 4 DAYS 2017 11:45 AM    Culture result: MODERATE  NORMAL RESPIRATORY ELOY   2017 11:09 AM Culture result: MRSA NOT PRESENT 05/09/2017 11:09 AM    Culture result:  05/09/2017 11:09 AM         Screening of patient nares for MRSA is for surveillance purposes and, if positive, to facilitate isolation considerations in high risk settings. It is not intended for automatic decolonization interventions per se as regimens are not sufficiently effective to warrant routine use.        Anti-infectives:   Zosyn    Impression:   · Likely atelectasis lung  · UTI    Plan:   · Stop Zosyn in am

## 2017-05-13 NOTE — PROGRESS NOTES
Problem: Pressure Injury - Risk of  Goal: *Prevention of pressure ulcer  Outcome: Progressing Towards Goal  Spending time out of bed in the chair  Low risk for skin breakdown  Very good personal hygiene  Turning or repositioning in the bed without assistance appropriately              Problem: Falls - Risk of  Goal: *Absence of falls  Outcome: Progressing Towards Goal  Calls for assistance appropriately  Slip resistant footwear  Clutter free room  Up with assistance only  Bedside commode  Q1 hour safety checks        Problem: Infection - Risk of, Multi-drug Resistant Organism Colonization (MDRO)  Goal: *Absence of infection signs and symptoms  Outcome: Progressing Towards Goal  Afebrile

## 2017-05-13 NOTE — PROGRESS NOTES
1930 Bedside shift change report given to Mabelene Goldberg (oncoming nurse) by Isadora Trevizo (offgoing nurse). Report included the following information SBAR, Kardex, Procedure Summary, Intake/Output, MAR, Recent Results and Cardiac Rhythm NSR.    1945 Pt back from XR. In good spirits, steady on his feet, and not hallucinating.     0600 Pt urinated fair amount onto floor. 0630 TRANSFER - OUT REPORT:    Verbal report given to Lily (name) on Nando Devine  being transferred to [facility] (unit) for routine progression of care       Report consisted of patients Situation, Background, Assessment and   Recommendations(SBAR). Information from the following report(s) SBAR, Kardex, ED Summary, Procedure Summary, Intake/Output, MAR, Recent Results and Cardiac Rhythm NSR was reviewed with the receiving nurse.     Lines:   Midline Catheter 42/78/71 Left;Basilic (Active)   Criteria for Appropriate Use Hemodynamically unstable, requiring monitoring lines, vasopressors, or volume resuscitation 5/13/2017  8:00 PM   Site Assessment Clean, dry, & intact 5/13/2017  8:00 PM   Phlebitis Assessment 0 5/13/2017  8:00 PM   Infiltration Assessment 0 5/13/2017  8:00 PM   Dressing Status Clean, dry, & intact 5/13/2017  8:00 PM   Dressing Type Transparent 5/13/2017  8:00 PM   Action Taken Open ports on tubing capped 5/13/2017  8:00 PM   Hub Color/Line Status Green 5/13/2017  8:00 PM   Date of Last Dressing Change 05/09/17 5/13/2017  8:00 PM   Alcohol Cap Used Yes 5/13/2017  8:00 PM       Peripheral IV 05/08/17 Right Forearm (Active)   Site Assessment Clean, dry, & intact 5/13/2017  8:00 PM   Phlebitis Assessment 0 5/13/2017  8:00 PM   Infiltration Assessment 0 5/13/2017  8:00 PM   Dressing Status Clean, dry, & intact 5/13/2017  8:00 PM   Dressing Type Transparent 5/13/2017  8:00 PM   Hub Color/Line Status Pink 5/13/2017  8:00 PM   Action Taken Open ports on tubing capped 5/13/2017  8:00 PM   Alcohol Cap Used Yes 5/13/2017  8:00 PM       Peripheral IV 05/12/17 Left Hand (Active)   Site Assessment Clean, dry, & intact 5/13/2017  8:00 PM   Phlebitis Assessment 0 5/13/2017  8:00 PM   Infiltration Assessment 0 5/13/2017  8:00 PM   Dressing Status Clean, dry, & intact 5/13/2017  8:00 PM   Dressing Type Transparent 5/13/2017  8:00 PM   Hub Color/Line Status Pink 5/13/2017  8:00 PM   Action Taken Open ports on tubing capped 5/13/2017  8:00 PM   Alcohol Cap Used Yes 5/13/2017  8:00 PM        Opportunity for questions and clarification was provided.       Patient transported with:   Monitor  Registered Nurse  Tech

## 2017-05-14 LAB
ALBUMIN SERPL BCP-MCNC: 3.3 G/DL (ref 3.5–5)
ALBUMIN/GLOB SERPL: 0.7 {RATIO} (ref 1.1–2.2)
ALP SERPL-CCNC: 95 U/L (ref 45–117)
ALT SERPL-CCNC: 63 U/L (ref 12–78)
ANION GAP BLD CALC-SCNC: 11 MMOL/L (ref 5–15)
AST SERPL W P-5'-P-CCNC: 38 U/L (ref 15–37)
BASOPHILS # BLD AUTO: 0.1 K/UL (ref 0–0.1)
BASOPHILS # BLD AUTO: 0.1 K/UL (ref 0–0.1)
BASOPHILS # BLD: 0 % (ref 0–1)
BASOPHILS # BLD: 0 % (ref 0–1)
BILIRUB SERPL-MCNC: 0.9 MG/DL (ref 0.2–1)
BUN SERPL-MCNC: 19 MG/DL (ref 6–20)
BUN/CREAT SERPL: 17 (ref 12–20)
CALCIUM SERPL-MCNC: 9.7 MG/DL (ref 8.5–10.1)
CHLORIDE SERPL-SCNC: 100 MMOL/L (ref 97–108)
CO2 SERPL-SCNC: 29 MMOL/L (ref 21–32)
CREAT SERPL-MCNC: 1.15 MG/DL (ref 0.7–1.3)
EOSINOPHIL # BLD: 0.3 K/UL (ref 0–0.4)
EOSINOPHIL # BLD: 0.5 K/UL (ref 0–0.4)
EOSINOPHIL NFR BLD: 2 % (ref 0–7)
EOSINOPHIL NFR BLD: 3 % (ref 0–7)
ERYTHROCYTE [DISTWIDTH] IN BLOOD BY AUTOMATED COUNT: 15.6 % (ref 11.5–14.5)
ERYTHROCYTE [DISTWIDTH] IN BLOOD BY AUTOMATED COUNT: 15.6 % (ref 11.5–14.5)
GLOBULIN SER CALC-MCNC: 4.9 G/DL (ref 2–4)
GLUCOSE SERPL-MCNC: 128 MG/DL (ref 65–100)
HCT VFR BLD AUTO: 42.7 % (ref 36.6–50.3)
HCT VFR BLD AUTO: 45.6 % (ref 36.6–50.3)
HGB BLD-MCNC: 14 G/DL (ref 12.1–17)
HGB BLD-MCNC: 15 G/DL (ref 12.1–17)
LYMPHOCYTES # BLD AUTO: 13 % (ref 12–49)
LYMPHOCYTES # BLD AUTO: 13 % (ref 12–49)
LYMPHOCYTES # BLD: 1.9 K/UL (ref 0.8–3.5)
LYMPHOCYTES # BLD: 2.4 K/UL (ref 0.8–3.5)
MAGNESIUM SERPL-MCNC: 2.6 MG/DL (ref 1.6–2.4)
MCH RBC QN AUTO: 29.9 PG (ref 26–34)
MCH RBC QN AUTO: 29.9 PG (ref 26–34)
MCHC RBC AUTO-ENTMCNC: 32.8 G/DL (ref 30–36.5)
MCHC RBC AUTO-ENTMCNC: 32.9 G/DL (ref 30–36.5)
MCV RBC AUTO: 90.8 FL (ref 80–99)
MCV RBC AUTO: 91 FL (ref 80–99)
MONOCYTES # BLD: 1.5 K/UL (ref 0–1)
MONOCYTES # BLD: 1.9 K/UL (ref 0–1)
MONOCYTES NFR BLD AUTO: 10 % (ref 5–13)
MONOCYTES NFR BLD AUTO: 10 % (ref 5–13)
NEUTS SEG # BLD: 11.2 K/UL (ref 1.8–8)
NEUTS SEG # BLD: 14 K/UL (ref 1.8–8)
NEUTS SEG NFR BLD AUTO: 74 % (ref 32–75)
NEUTS SEG NFR BLD AUTO: 75 % (ref 32–75)
PHOSPHATE SERPL-MCNC: 3.2 MG/DL (ref 2.6–4.7)
PLATELET # BLD AUTO: 368 K/UL (ref 150–400)
PLATELET # BLD AUTO: 433 K/UL (ref 150–400)
POTASSIUM SERPL-SCNC: 3.4 MMOL/L (ref 3.5–5.1)
PROT SERPL-MCNC: 8.2 G/DL (ref 6.4–8.2)
RBC # BLD AUTO: 4.69 M/UL (ref 4.1–5.7)
RBC # BLD AUTO: 5.02 M/UL (ref 4.1–5.7)
SODIUM SERPL-SCNC: 140 MMOL/L (ref 136–145)
WBC # BLD AUTO: 15 K/UL (ref 4.1–11.1)
WBC # BLD AUTO: 18.9 K/UL (ref 4.1–11.1)

## 2017-05-14 PROCEDURE — 74011250637 HC RX REV CODE- 250/637: Performed by: INTERNAL MEDICINE

## 2017-05-14 PROCEDURE — 74011000258 HC RX REV CODE- 258: Performed by: FAMILY MEDICINE

## 2017-05-14 PROCEDURE — 84100 ASSAY OF PHOSPHORUS: CPT | Performed by: INTERNAL MEDICINE

## 2017-05-14 PROCEDURE — 74011250636 HC RX REV CODE- 250/636: Performed by: INTERNAL MEDICINE

## 2017-05-14 PROCEDURE — 83735 ASSAY OF MAGNESIUM: CPT | Performed by: INTERNAL MEDICINE

## 2017-05-14 PROCEDURE — 65660000000 HC RM CCU STEPDOWN

## 2017-05-14 PROCEDURE — 74011250637 HC RX REV CODE- 250/637: Performed by: PHYSICAL MEDICINE & REHABILITATION

## 2017-05-14 PROCEDURE — 87070 CULTURE OTHR SPECIMN AEROBIC: CPT | Performed by: INTERNAL MEDICINE

## 2017-05-14 PROCEDURE — 87205 SMEAR GRAM STAIN: CPT | Performed by: INTERNAL MEDICINE

## 2017-05-14 PROCEDURE — 77030011943

## 2017-05-14 PROCEDURE — 85025 COMPLETE CBC W/AUTO DIFF WBC: CPT | Performed by: INTERNAL MEDICINE

## 2017-05-14 PROCEDURE — 80053 COMPREHEN METABOLIC PANEL: CPT | Performed by: INTERNAL MEDICINE

## 2017-05-14 PROCEDURE — 36415 COLL VENOUS BLD VENIPUNCTURE: CPT | Performed by: INTERNAL MEDICINE

## 2017-05-14 PROCEDURE — 77030027138 HC INCENT SPIROMETER -A

## 2017-05-14 PROCEDURE — 51798 US URINE CAPACITY MEASURE: CPT

## 2017-05-14 PROCEDURE — 74011250637 HC RX REV CODE- 250/637: Performed by: FAMILY MEDICINE

## 2017-05-14 PROCEDURE — 74011250636 HC RX REV CODE- 250/636: Performed by: FAMILY MEDICINE

## 2017-05-14 PROCEDURE — 77010033678 HC OXYGEN DAILY

## 2017-05-14 RX ORDER — POTASSIUM CHLORIDE 750 MG/1
40 TABLET, FILM COATED, EXTENDED RELEASE ORAL
Status: COMPLETED | OUTPATIENT
Start: 2017-05-14 | End: 2017-05-14

## 2017-05-14 RX ADMIN — ATORVASTATIN CALCIUM 40 MG: 40 TABLET, FILM COATED ORAL at 17:57

## 2017-05-14 RX ADMIN — ENOXAPARIN SODIUM 40 MG: 40 INJECTION SUBCUTANEOUS at 17:08

## 2017-05-14 RX ADMIN — MORPHINE SULFATE 60 MG: 60 TABLET, FILM COATED, EXTENDED RELEASE ORAL at 20:31

## 2017-05-14 RX ADMIN — Medication 1 CAPSULE: at 08:43

## 2017-05-14 RX ADMIN — PIPERACILLIN SODIUM,TAZOBACTAM SODIUM 3.38 G: 3; .375 INJECTION, POWDER, FOR SOLUTION INTRAVENOUS at 14:15

## 2017-05-14 RX ADMIN — Medication 10 ML: at 21:57

## 2017-05-14 RX ADMIN — POTASSIUM CHLORIDE 40 MEQ: 750 TABLET, FILM COATED, EXTENDED RELEASE ORAL at 17:06

## 2017-05-14 RX ADMIN — ASPIRIN 81 MG CHEWABLE TABLET 81 MG: 81 TABLET CHEWABLE at 08:43

## 2017-05-14 RX ADMIN — PIPERACILLIN SODIUM,TAZOBACTAM SODIUM 3.38 G: 3; .375 INJECTION, POWDER, FOR SOLUTION INTRAVENOUS at 21:57

## 2017-05-14 RX ADMIN — Medication 10 ML: at 06:00

## 2017-05-14 RX ADMIN — Medication 10 ML: at 14:17

## 2017-05-14 RX ADMIN — FUROSEMIDE 40 MG: 40 TABLET ORAL at 08:43

## 2017-05-14 RX ADMIN — ATENOLOL 25 MG: 25 TABLET ORAL at 08:43

## 2017-05-14 RX ADMIN — PIPERACILLIN SODIUM,TAZOBACTAM SODIUM 3.38 G: 3; .375 INJECTION, POWDER, FOR SOLUTION INTRAVENOUS at 06:00

## 2017-05-14 RX ADMIN — MORPHINE SULFATE 60 MG: 60 TABLET, FILM COATED, EXTENDED RELEASE ORAL at 08:43

## 2017-05-14 NOTE — ROUTINE PROCESS
1543 per phone call with Dr Wily Villarreal - reported bladder scanner results 132ml and no complaint of pain or tenderness. Reported to Dr Wily Villarreal -- keep watch on patient. No changes at this time.
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
## 2017-05-14 NOTE — PROGRESS NOTES
Infectious Disease Coverage Note     Admit Date: 2017  Dana Mariano is a 62 y.o.male With h/o HTN, chronic pain syndrome, opioid dependence initially presented to [address] [place] [address] on 5/3 with fatigue, subjective fever. Temp of 99.4 in the ED. He was drowsy and was given Narcan which made him combative for which he was given Fentanyl 100mg and morphine and ativan and he became hypoxic, hypercarbic and was intubated. He was treated with levaquin for a rt lower lobe infiltrate and for enterococcus in the urine . He was transferred to [facility] for cardiac cath. Subjective:   Feels fine, up in bed     Review of Systems:   Constitutional: negative for fevers and chills  Ears, Nose, Mouth, Throat, and Face: negative  Respiratory: negative for cough or sputum  Cardiovascular: negative for chest pain  Gastrointestinal: negative for nausea and vomiting and no loose BM  Genitourinary:negative      Objective:   Blood pressure (!) 150/95, pulse 86, temperature 97.5 °F (36.4 °C), resp. rate 18, weight 90.8 kg (200 lb 2.8 oz), SpO2 92 %. Temp (24hrs), Av.2 °F (36.8 °C), Min:97.5 °F (36.4 °C), Max:98.5 °F (36.9 °C)      Physical Exam:    General: alert, cooperative, no distress, appears stated age  Neck: no erythema or exudates noted. Lungs: Few crackles noted  Heart: regular rate and rhythm  Abdomen: soft, non-tender.  Bowel sounds normal. No masses, no organomegaly    Data Review:   CBC:   Recent Labs      17   0608  17   0415   WBC  18.9*  10.4   RBC  5.02  4.13   HGB  15.0  12.5   HCT  45.6  36.6   PLT  433*  304   GRANS  74  75   LYMPH  13  12   EOS  3  1     CMP:   Recent Labs      17   0608  17   0415   GLU  128*  102*   NA  140  135*   K  3.4*  2.7*   CL  100  96*   CO2  29  27   BUN  19  14   CREA  1.15  0.80   CA  9.7  8.7   AGAP  11  12   BUCR  17  18   AP  95   --    TP  8.2   --    ALB  3.3*   --    GLOB  4.9*   --    AGRAT  0.7*   --        Microbiology:     No results found for: SDES  Lab Results   Component Value Date/Time    Culture result: NO GROWTH 5 DAYS 05/09/2017 11:45 AM    Culture result: MODERATE  NORMAL RESPIRATORY ELOY   05/09/2017 11:09 AM    Culture result: MRSA NOT PRESENT 05/09/2017 11:09 AM    Culture result:  05/09/2017 11:09 AM         Screening of patient nares for MRSA is for surveillance purposes and, if positive, to facilitate isolation considerations in high risk settings. It is not intended for automatic decolonization interventions per se as regimens are not sufficiently effective to warrant routine use.        Anti-infectives:   Zosyn    Impression:   · Likely atelectasis lung  · UTI  · Leukocytosis likely an error based upon other CBC parameters non compatible with the trends and clinically unchanged    Plan:   · Repeat CBC and if no leukocytosis stop Zosyn  · If persistent leukocytosis, may need further work-up  · Spoke with Dr. Mackenzie Torres  · Dr Daiana Martinez will resume care in am

## 2017-05-14 NOTE — PROGRESS NOTES
TRANSFER - IN REPORT:    Verbal report received from [address] [place] [address] (name) on Ivelisse Kelly  being received from CCU (unit) for routine progression of care      Report consisted of patients Situation, Background, Assessment and   Recommendations(SBAR). Information from the following report(s) SBAR, Intake/Output, MAR, Recent Results and Cardiac Rhythm NSR was reviewed with the receiving nurse. Opportunity for questions and clarification was provided. Assessment completed upon patients arrival to unit and care assumed.        Primary Nurse Robyn Clarke RN and Dellia Lundborg , RN performed a dual skin assessment on this patient No impairment noted  Kavin score is 18

## 2017-05-15 LAB
ALBUMIN SERPL BCP-MCNC: 3.3 G/DL (ref 3.5–5)
ALBUMIN/GLOB SERPL: 0.7 {RATIO} (ref 1.1–2.2)
ALP SERPL-CCNC: 80 U/L (ref 45–117)
ALT SERPL-CCNC: 51 U/L (ref 12–78)
ANION GAP BLD CALC-SCNC: 6 MMOL/L (ref 5–15)
AST SERPL W P-5'-P-CCNC: 28 U/L (ref 15–37)
BACTERIA SPEC CULT: NORMAL
BASOPHILS # BLD AUTO: 0.1 K/UL (ref 0–0.1)
BASOPHILS # BLD: 1 % (ref 0–1)
BILIRUB SERPL-MCNC: 0.6 MG/DL (ref 0.2–1)
BUN SERPL-MCNC: 22 MG/DL (ref 6–20)
BUN/CREAT SERPL: 21 (ref 12–20)
CALCIUM SERPL-MCNC: 9.3 MG/DL (ref 8.5–10.1)
CHLORIDE SERPL-SCNC: 100 MMOL/L (ref 97–108)
CO2 SERPL-SCNC: 31 MMOL/L (ref 21–32)
CREAT SERPL-MCNC: 1.03 MG/DL (ref 0.7–1.3)
EOSINOPHIL # BLD: 0.4 K/UL (ref 0–0.4)
EOSINOPHIL NFR BLD: 3 % (ref 0–7)
ERYTHROCYTE [DISTWIDTH] IN BLOOD BY AUTOMATED COUNT: 15.4 % (ref 11.5–14.5)
GLOBULIN SER CALC-MCNC: 4.5 G/DL (ref 2–4)
GLUCOSE SERPL-MCNC: 134 MG/DL (ref 65–100)
HCT VFR BLD AUTO: 43.4 % (ref 36.6–50.3)
HGB BLD-MCNC: 14.4 G/DL (ref 12.1–17)
LYMPHOCYTES # BLD AUTO: 19 % (ref 12–49)
LYMPHOCYTES # BLD: 2.2 K/UL (ref 0.8–3.5)
MAGNESIUM SERPL-MCNC: 2.5 MG/DL (ref 1.6–2.4)
MCH RBC QN AUTO: 30.3 PG (ref 26–34)
MCHC RBC AUTO-ENTMCNC: 33.2 G/DL (ref 30–36.5)
MCV RBC AUTO: 91.2 FL (ref 80–99)
MONOCYTES # BLD: 1.3 K/UL (ref 0–1)
MONOCYTES NFR BLD AUTO: 11 % (ref 5–13)
NEUTS SEG # BLD: 7.6 K/UL (ref 1.8–8)
NEUTS SEG NFR BLD AUTO: 66 % (ref 32–75)
PHOSPHATE SERPL-MCNC: 2.1 MG/DL (ref 2.6–4.7)
PLATELET # BLD AUTO: 384 K/UL (ref 150–400)
POTASSIUM SERPL-SCNC: 3.4 MMOL/L (ref 3.5–5.1)
PROT SERPL-MCNC: 7.8 G/DL (ref 6.4–8.2)
RBC # BLD AUTO: 4.76 M/UL (ref 4.1–5.7)
SERVICE CMNT-IMP: NORMAL
SODIUM SERPL-SCNC: 137 MMOL/L (ref 136–145)
WBC # BLD AUTO: 11.5 K/UL (ref 4.1–11.1)

## 2017-05-15 PROCEDURE — 74011250636 HC RX REV CODE- 250/636: Performed by: INTERNAL MEDICINE

## 2017-05-15 PROCEDURE — 74011250637 HC RX REV CODE- 250/637: Performed by: INTERNAL MEDICINE

## 2017-05-15 PROCEDURE — G8987 SELF CARE CURRENT STATUS: HCPCS

## 2017-05-15 PROCEDURE — 83735 ASSAY OF MAGNESIUM: CPT | Performed by: INTERNAL MEDICINE

## 2017-05-15 PROCEDURE — 97166 OT EVAL MOD COMPLEX 45 MIN: CPT

## 2017-05-15 PROCEDURE — 74011250637 HC RX REV CODE- 250/637: Performed by: FAMILY MEDICINE

## 2017-05-15 PROCEDURE — G8988 SELF CARE GOAL STATUS: HCPCS

## 2017-05-15 PROCEDURE — 36415 COLL VENOUS BLD VENIPUNCTURE: CPT | Performed by: INTERNAL MEDICINE

## 2017-05-15 PROCEDURE — 84100 ASSAY OF PHOSPHORUS: CPT | Performed by: INTERNAL MEDICINE

## 2017-05-15 PROCEDURE — 74011000258 HC RX REV CODE- 258: Performed by: FAMILY MEDICINE

## 2017-05-15 PROCEDURE — 97116 GAIT TRAINING THERAPY: CPT | Performed by: PHYSICAL THERAPIST

## 2017-05-15 PROCEDURE — 97535 SELF CARE MNGMENT TRAINING: CPT

## 2017-05-15 PROCEDURE — 74011000250 HC RX REV CODE- 250: Performed by: INTERNAL MEDICINE

## 2017-05-15 PROCEDURE — 80053 COMPREHEN METABOLIC PANEL: CPT | Performed by: INTERNAL MEDICINE

## 2017-05-15 PROCEDURE — 74011000258 HC RX REV CODE- 258: Performed by: INTERNAL MEDICINE

## 2017-05-15 PROCEDURE — 74011250637 HC RX REV CODE- 250/637: Performed by: PHYSICAL MEDICINE & REHABILITATION

## 2017-05-15 PROCEDURE — 74011250636 HC RX REV CODE- 250/636: Performed by: FAMILY MEDICINE

## 2017-05-15 PROCEDURE — 85025 COMPLETE CBC W/AUTO DIFF WBC: CPT | Performed by: INTERNAL MEDICINE

## 2017-05-15 PROCEDURE — 65660000000 HC RM CCU STEPDOWN

## 2017-05-15 RX ORDER — POTASSIUM CHLORIDE 750 MG/1
40 TABLET, FILM COATED, EXTENDED RELEASE ORAL
Status: COMPLETED | OUTPATIENT
Start: 2017-05-15 | End: 2017-05-15

## 2017-05-15 RX ORDER — CARVEDILOL 12.5 MG/1
12.5 TABLET ORAL 2 TIMES DAILY WITH MEALS
Status: DISCONTINUED | OUTPATIENT
Start: 2017-05-15 | End: 2017-05-18 | Stop reason: HOSPADM

## 2017-05-15 RX ORDER — POLYETHYLENE GLYCOL 3350 17 G/17G
17 POWDER, FOR SOLUTION ORAL DAILY PRN
Status: DISCONTINUED | OUTPATIENT
Start: 2017-05-15 | End: 2017-05-18 | Stop reason: HOSPADM

## 2017-05-15 RX ORDER — MORPHINE SULFATE 100 MG/1
100 TABLET, FILM COATED, EXTENDED RELEASE ORAL
Status: DISCONTINUED | OUTPATIENT
Start: 2017-05-15 | End: 2017-05-18 | Stop reason: HOSPADM

## 2017-05-15 RX ADMIN — SODIUM CHLORIDE: 900 INJECTION, SOLUTION INTRAVENOUS at 14:45

## 2017-05-15 RX ADMIN — Medication 10 ML: at 05:41

## 2017-05-15 RX ADMIN — PIPERACILLIN SODIUM,TAZOBACTAM SODIUM 3.38 G: 3; .375 INJECTION, POWDER, FOR SOLUTION INTRAVENOUS at 13:37

## 2017-05-15 RX ADMIN — POTASSIUM CHLORIDE 40 MEQ: 750 TABLET, FILM COATED, EXTENDED RELEASE ORAL at 14:22

## 2017-05-15 RX ADMIN — CARVEDILOL 12.5 MG: 12.5 TABLET, FILM COATED ORAL at 19:22

## 2017-05-15 RX ADMIN — MORPHINE SULFATE 60 MG: 60 TABLET, FILM COATED, EXTENDED RELEASE ORAL at 09:15

## 2017-05-15 RX ADMIN — PIPERACILLIN SODIUM,TAZOBACTAM SODIUM 3.38 G: 3; .375 INJECTION, POWDER, FOR SOLUTION INTRAVENOUS at 05:41

## 2017-05-15 RX ADMIN — Medication 10 ML: at 13:39

## 2017-05-15 RX ADMIN — MORPHINE SULFATE 100 MG: 100 TABLET, EXTENDED RELEASE ORAL at 21:14

## 2017-05-15 RX ADMIN — Medication 10 ML: at 21:14

## 2017-05-15 RX ADMIN — PIPERACILLIN SODIUM,TAZOBACTAM SODIUM 3.38 G: 3; .375 INJECTION, POWDER, FOR SOLUTION INTRAVENOUS at 22:02

## 2017-05-15 RX ADMIN — FUROSEMIDE 40 MG: 40 TABLET ORAL at 09:15

## 2017-05-15 RX ADMIN — ATORVASTATIN CALCIUM 40 MG: 40 TABLET, FILM COATED ORAL at 19:18

## 2017-05-15 RX ADMIN — Medication 1 CAPSULE: at 09:15

## 2017-05-15 RX ADMIN — ASPIRIN 81 MG CHEWABLE TABLET 81 MG: 81 TABLET CHEWABLE at 09:15

## 2017-05-15 RX ADMIN — CARVEDILOL 12.5 MG: 12.5 TABLET, FILM COATED ORAL at 09:15

## 2017-05-15 RX ADMIN — ENOXAPARIN SODIUM 40 MG: 40 INJECTION SUBCUTANEOUS at 19:22

## 2017-05-15 NOTE — PROGRESS NOTES
Problem: Mobility Impaired (Adult and Pediatric)  Goal: *Acute Goals and Plan of Care (Insert Text)  Physical Therapy Goals  Initiated 5/12/2017  1. Patient will move from supine to sit and sit to supine in bed with modified independence within 7 day(s). 2. Patient will transfer from bed to chair and chair to bed with modified independence using the least restrictive device within 7 day(s). 3. Patient will perform sit to stand with modified independence within 7 day(s). 4. Patient will ambulate with modified independence for 250 feet with the least restrictive device within 7 day(s). 5. Patient will ascend/descend 4 stairs with 2 handrail(s) with modified independence within 7 day(s). PHYSICAL THERAPY TREATMENT  Patient: Bing Costa (7 y.o. male)  Date: 5/15/2017  Diagnosis: nstemi  NSTEMI (non-ST elevated myocardial infarction) [facility]) NSTEMI (non-ST elevated myocardial infarction) ([facility] [address]       Precautions: Fall, Contact, Bed Alarm, WBAT (EF 30-35%; acute care admit 5-3 for pneumonia- placed onVent)      ASSESSMENT:  Patient received supine in bed and agreeable to PT. Supine to sit with supervision and additional time to complete task. Sit to stand with CGA with good initial standing balance with RW. Able to amb approx 100 feet with RW and CGA. Displays decreased step clearance and slow dana but no overt LOB. SpO2 96% on room air at start of session. After amb SpO2 89% on room air which recovers quickly to 94% with PLB on room air. Patient left up in chair with needs in reach and bed alarm in place. Recommend rehab vs  PT with 24 hr supervision at [place]. Progression toward goals:  [X]    Improving appropriately and progressing toward goals  [ ]    Improving slowly and progressing toward goals  [ ]    Not making progress toward goals and plan of care will be adjusted       PLAN:  Patient continues to benefit from skilled intervention to address the above impairments.   Continue treatment per established plan of care. Discharge Recommendations:  [address] with 24 hr S  Further Equipment Recommendations for Discharge:  TBD       SUBJECTIVE:   Patient stated My wife and I are retired so she can be with me all the time.       OBJECTIVE DATA SUMMARY:   Critical Behavior:  Neurologic State: Alert  Orientation Level: Oriented X4 (not aware of how long he was ill or when he came to hospital)  Cognition: Impulsive, Impaired decision making, Follows commands (recommend MoCA to clarify cognition)  Safety/Judgement: Decreased insight into deficits, Decreased awareness of need for safety, Decreased awareness of need for assistance (\"I dont know why I'm not supposed to get up by myself. \")  Functional Mobility Training:  Bed Mobility:  Rolling: Supervision  Supine to Sit: Supervision  Sit to Supine: Supervision;Contact guard assistance  Scooting: Contact guard assistance        Transfers:  Sit to Stand: Contact guard assistance  Stand to Sit: Contact guard assistance        Bed to Chair: Minimum assistance                    Balance:  Sitting: Intact  Sitting - Static: Good (unsupported)  Sitting - Dynamic: Fair (occasional)  Standing: Impaired  Standing - Static: Constant support; Fair  Standing - Dynamic : Fair  Ambulation/Gait Training:  Distance (ft): 100 Feet (ft)  Assistive Device: Gait belt;Walker, rolling  Ambulation - Level of Assistance: Contact guard assistance        Gait Abnormalities: Decreased step clearance        Base of Support: Narrowed     Speed/Velvet: Shuffled; Slow  Step Length: Left shortened;Right shortened        Pain:  Pain Scale 1: Numeric (0 - 10)  Pain Intensity 1: 0              Activity Tolerance:   See above  Please refer to the flowsheet for vital signs taken during this treatment.   After treatment:   [X]    Patient left in no apparent distress sitting up in chair  [ ]    Patient left in no apparent distress in bed  [X]    Call bell left within reach  [X]    Nursing notified  [ ]    Caregiver present  [X]    Bed alarm activated      COMMUNICATION/COLLABORATION:   The patients plan of care was discussed with: Physical Therapist and Registered Nurse     Chloe Fraser, PT, DPT   Time Calculation: 17 mins
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
## 2017-05-15 NOTE — PROGRESS NOTES
Responded to request from RN to visit with patient per his request to see a [employer] . Upon entering the room and introducing myself the patient stated that he did not wish to talk to a  unless they were a [place] [id number] . More June the role of chaplains in the hospital including informing him of  visitation, clarifying his request, and facilitating visitation with visiting priests. Patient stated his desire for confession and that a visit on Monday would be fine. Advised patient of availability of [id number] [place] [address] for further support as needed and desired. This  will pass on this referral to Monday day time chaplains. Chaplain Carlos Ennis M.Div.    Paging Service [phone] ([phone])

## 2017-05-15 NOTE — ROUTINE PROCESS
1536:  Update Princess on dispo plan. Will page Dr. Marguerite Heimlich to call and update her per her request.  Dr. Marguerite Heimlich return called and voiced she will call [facility].
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
## 2017-05-15 NOTE — ROUTINE PROCESS
Bedside shift change report given to Adalid Sethi RN (oncoming nurse) by Jesi Melendez RN (offgoing nurse). Report included the following information SBAR, Kardex, Intake/Output, MAR and Recent Results.

## 2017-05-15 NOTE — PROGRESS NOTES
Problem: Pressure Injury - Risk of  Goal: *Prevention of pressure ulcer  Outcome: Progressing Towards Goal  Patient is without skin breakdown at this time. Problem: Falls - Risk of  Goal: *Absence of falls  Outcome: Progressing Towards Goal  Patient is without falls at this time. Bed is locked and in lowest position. Call bell and bedside table are within reach. Goal: *Knowledge of fall prevention  Outcome: Progressing Towards Goal  Patient is encouraged to call when assistance is needed.         Problem: Heart Failure: Discharge Outcomes  Goal: *Verbalizes understanding and describes prescribed diet  Outcome: Progressing Towards Goal  Patient is tolerating a cardiac diet
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
## 2017-05-15 NOTE — PROGRESS NOTES
Mr Krystyna Quintanilla remains stable from cards perspective and is receiving ongoing treatment for his pneumonia  5/15/2017     Admit Date: 5/8/2017    Admit Diagnosis: nstemi  NSTEMI (non-ST elevated myocardial infarction) [facility])    Principal Problem:    NSTEMI (non-ST elevated myocardial infarction) ([facility] [address] (5/8/2017)    Active Problems:    Pneumonia (5/8/2017)      Overdose opiate (5/8/2017)      Chronic pain (5/8/2017)        Assessment/Plan:  1. Stable cardiac status: recovering from his transient apical ballooning  2. Being treated for pneumonia, possibly from aspiration  Plan: from a cards perspective he can be discharged at any time once his PNA is better     Subjective:  Feels well       Jjharjeet Easleyjoanvivienne denies chest pain, chest pressure/discomfort, dyspnea, orthopnea, paroxysmal nocturnal dyspnea. Objective:     Visit Vitals    /84 (BP 1 Location: Right arm, BP Patient Position: At rest)    Pulse 76    Temp 98.4 °F (36.9 °C)    Resp 16    Wt 91.5 kg (201 lb 11.5 oz)    SpO2 96%        Physical Exam:  Neck: supple, symmetrical, trachea midline, no adenopathy, thyroid: not enlarged, symmetric, no tenderness/mass/nodules, no carotid bruit and no JVD  Heart: regular rate and rhythm, S1, S2 normal, no S3 or S4  Lungs: clear to auscultation bilaterally  Abdomen: soft, non-tender. Bowel sounds normal. No masses,  no organomegaly  Extremities: extremities normal, atraumatic, no cyanosis or edema  Pulses: 2+ and symmetric  Neurologic: Grossly normal      Labs:    No results for input(s): CPK, CKMB, TROIQ in the last 72 hours. No lab exists for component: CKQMB, CPKMB  Recent Labs      05/15/17   0244   NA  137   K  3.4*   CL  100   BUN  22*   CREA  1.03   GLU  134*   PHOS  2.1*   CA  9.3     Recent Labs      05/15/17   0244   WBC  11.5*   HGB  14.4   HCT  43.4   PLT  384     No results for input(s): TGL, CHOL, LDLC in the last 72 hours.     No lab exists for component: HDLC,  HBA1C    Telemetry: normal sinus rhythm     Data Review: current meds, labs,recent radiology, intake/output/weight and problem list reviewed

## 2017-05-15 NOTE — PROGRESS NOTES
Bedside and Verbal shift change report given to SARTHAK Figueroa (oncoming nurse) by Machelle Triana RN (offgoing nurse). Report included the following information SBAR, Kardex, Intake/Output, MAR, Recent Results and Cardiac Rhythm NSR.
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
## 2017-05-15 NOTE — PROGRESS NOTES
Care Management: patient resting in bed, alert and oriented and communicating needs. Patient admitted 5-8-17 with MI. Came from [address] [place] [address] and did a direct admit to CCU here at [facility]. Could not wean off vent initially but that resolved. Saw primary MD about 1 month ago: Dr. Chantal Rothman. Lars Low ( [phone]). Receives meds from [address] [place] [address] with no issues. Choice for rehab is [address] [place] [address] in [facility]. Lives at home with spouse \" 2 dogs and 1 cat\". Spouse will provide transportation upon discharge. Will make referral to [address] [address] in [facility] and will follow transition of care.         Jim Power RN BSN CM
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
## 2017-05-16 ENCOUNTER — APPOINTMENT (OUTPATIENT)
Dept: GENERAL RADIOLOGY | Age: 62
DRG: 280 | End: 2017-05-16
Attending: INTERNAL MEDICINE
Payer: MEDICARE

## 2017-05-16 LAB
ALBUMIN SERPL BCP-MCNC: 3.2 G/DL (ref 3.5–5)
ALBUMIN/GLOB SERPL: 0.8 {RATIO} (ref 1.1–2.2)
ALP SERPL-CCNC: 73 U/L (ref 45–117)
ALT SERPL-CCNC: 40 U/L (ref 12–78)
ANION GAP BLD CALC-SCNC: 9 MMOL/L (ref 5–15)
AST SERPL W P-5'-P-CCNC: 27 U/L (ref 15–37)
BACTERIA SPEC CULT: NORMAL
BACTERIA SPEC CULT: NORMAL
BASOPHILS # BLD AUTO: 0.1 K/UL (ref 0–0.1)
BASOPHILS # BLD: 1 % (ref 0–1)
BILIRUB SERPL-MCNC: 0.6 MG/DL (ref 0.2–1)
BUN SERPL-MCNC: 17 MG/DL (ref 6–20)
BUN/CREAT SERPL: 21 (ref 12–20)
CALCIUM SERPL-MCNC: 9.5 MG/DL (ref 8.5–10.1)
CHLORIDE SERPL-SCNC: 100 MMOL/L (ref 97–108)
CO2 SERPL-SCNC: 29 MMOL/L (ref 21–32)
CREAT SERPL-MCNC: 0.82 MG/DL (ref 0.7–1.3)
EOSINOPHIL # BLD: 0.4 K/UL (ref 0–0.4)
EOSINOPHIL NFR BLD: 4 % (ref 0–7)
ERYTHROCYTE [DISTWIDTH] IN BLOOD BY AUTOMATED COUNT: 15.2 % (ref 11.5–14.5)
GLOBULIN SER CALC-MCNC: 4.2 G/DL (ref 2–4)
GLUCOSE SERPL-MCNC: 121 MG/DL (ref 65–100)
GRAM STN SPEC: NORMAL
HCT VFR BLD AUTO: 41.3 % (ref 36.6–50.3)
HGB BLD-MCNC: 14 G/DL (ref 12.1–17)
LYMPHOCYTES # BLD AUTO: 33 % (ref 12–49)
LYMPHOCYTES # BLD: 2.7 K/UL (ref 0.8–3.5)
MAGNESIUM SERPL-MCNC: 2.4 MG/DL (ref 1.6–2.4)
MCH RBC QN AUTO: 30.4 PG (ref 26–34)
MCHC RBC AUTO-ENTMCNC: 33.9 G/DL (ref 30–36.5)
MCV RBC AUTO: 89.8 FL (ref 80–99)
MONOCYTES # BLD: 0.7 K/UL (ref 0–1)
MONOCYTES NFR BLD AUTO: 9 % (ref 5–13)
NEUTS SEG # BLD: 4.4 K/UL (ref 1.8–8)
NEUTS SEG NFR BLD AUTO: 53 % (ref 32–75)
PHOSPHATE SERPL-MCNC: 3.3 MG/DL (ref 2.6–4.7)
PLATELET # BLD AUTO: 353 K/UL (ref 150–400)
POTASSIUM SERPL-SCNC: 3.4 MMOL/L (ref 3.5–5.1)
PROT SERPL-MCNC: 7.4 G/DL (ref 6.4–8.2)
RBC # BLD AUTO: 4.6 M/UL (ref 4.1–5.7)
SERVICE CMNT-IMP: NORMAL
SERVICE CMNT-IMP: NORMAL
SODIUM SERPL-SCNC: 138 MMOL/L (ref 136–145)
WBC # BLD AUTO: 8.2 K/UL (ref 4.1–11.1)

## 2017-05-16 PROCEDURE — 84100 ASSAY OF PHOSPHORUS: CPT | Performed by: INTERNAL MEDICINE

## 2017-05-16 PROCEDURE — 97116 GAIT TRAINING THERAPY: CPT

## 2017-05-16 PROCEDURE — 36415 COLL VENOUS BLD VENIPUNCTURE: CPT | Performed by: INTERNAL MEDICINE

## 2017-05-16 PROCEDURE — 74011250636 HC RX REV CODE- 250/636: Performed by: INTERNAL MEDICINE

## 2017-05-16 PROCEDURE — 65660000000 HC RM CCU STEPDOWN

## 2017-05-16 PROCEDURE — 74011250637 HC RX REV CODE- 250/637: Performed by: PHYSICAL MEDICINE & REHABILITATION

## 2017-05-16 PROCEDURE — 74011250637 HC RX REV CODE- 250/637: Performed by: INTERNAL MEDICINE

## 2017-05-16 PROCEDURE — 74011250637 HC RX REV CODE- 250/637: Performed by: FAMILY MEDICINE

## 2017-05-16 PROCEDURE — 97530 THERAPEUTIC ACTIVITIES: CPT

## 2017-05-16 PROCEDURE — 83735 ASSAY OF MAGNESIUM: CPT | Performed by: INTERNAL MEDICINE

## 2017-05-16 PROCEDURE — 80053 COMPREHEN METABOLIC PANEL: CPT | Performed by: INTERNAL MEDICINE

## 2017-05-16 PROCEDURE — 71020 XR CHEST PA LAT: CPT

## 2017-05-16 PROCEDURE — 85025 COMPLETE CBC W/AUTO DIFF WBC: CPT | Performed by: INTERNAL MEDICINE

## 2017-05-16 RX ADMIN — MORPHINE SULFATE 100 MG: 100 TABLET, EXTENDED RELEASE ORAL at 21:45

## 2017-05-16 RX ADMIN — MORPHINE SULFATE 75 MG: 60 TABLET, EXTENDED RELEASE ORAL at 08:59

## 2017-05-16 RX ADMIN — Medication 10 ML: at 21:45

## 2017-05-16 RX ADMIN — CARVEDILOL 12.5 MG: 12.5 TABLET, FILM COATED ORAL at 16:33

## 2017-05-16 RX ADMIN — Medication 1 CAPSULE: at 08:59

## 2017-05-16 RX ADMIN — ENOXAPARIN SODIUM 40 MG: 40 INJECTION SUBCUTANEOUS at 16:33

## 2017-05-16 RX ADMIN — Medication 10 ML: at 05:44

## 2017-05-16 RX ADMIN — FUROSEMIDE 40 MG: 40 TABLET ORAL at 08:59

## 2017-05-16 RX ADMIN — ATORVASTATIN CALCIUM 40 MG: 40 TABLET, FILM COATED ORAL at 17:31

## 2017-05-16 RX ADMIN — ASPIRIN 81 MG CHEWABLE TABLET 81 MG: 81 TABLET CHEWABLE at 09:00

## 2017-05-16 RX ADMIN — CARVEDILOL 12.5 MG: 12.5 TABLET, FILM COATED ORAL at 08:59
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
## 2017-05-16 NOTE — PROGRESS NOTES
Problem: Self Care Deficits Care Plan (Adult)  Goal: *Acute Goals and Plan of Care (Insert Text)  Occupational Therapy Goals  Initiated 5/15/2017  1. Patient will perform grooming in standing VSS with minimal assistance/contact guard assist within 7 day(s). 2. Patient will perform bathing with supervision/set-up within 7 day(s). 3. Patient will perform upper body dressing and lower body dressing with supervision/set-up VSS within 7 day(s). 4. Patient will perform toilet transfers VSS with minimal assistance/contact guard assist within 7 day(s). 5. Patient will perform all aspects of toileting VSS with minimal assistance/contact guard assist within 7 day(s). 6. Patient will participate in upper extremity therapeutic exercise/activities with modified independence VSS for 10 minutes within 7 day(s). 7. Patient will utilize energy conservation and safety techniques during functional activities with verbal, visual and tactile cues within 7 day(s). OCCUPATIONAL THERAPY TREATMENT  Patient: Bing Costa (63 y.o. male)  Date: 5/16/2017  Diagnosis: nstemi  NSTEMI (non-ST elevated myocardial infarction) [facility]) [facility]STEM[facility]non-ST [facility]       Precautions: Fall, Contact, Bed Alarm, WBAT (EF 30-35%; acute care admit 5-3 for pneumonia- placed onVent)      ASSESSMENT:     Chart reviewed and patient cleared for therapy. Patient agreeable to therapy today and agreed to ambulate to bathroom for grooming tasks. However, when in bathroom he stated that he \"did not want to brush his teeth right now. \"  He then ambulated to door, returned to chair, rested, ambulated to door, and returned to chair again. Required CGA for mobility with RW secondary to decreased safety awareness and improper use of RW. Patient pulls up on RW when transferring to standing position and picks up entire RW when turning quickly.  Therapist reviewed proper technique & hand placement for sit<> stand as well as for turning with RW. Patient then performed turn with RW safely but needs further review on RW safety. If patient were to discharge home, he would need 24/7 supervision for safety secondary to decreased safety awareness. Would continue to benefit from [facility] next treatment session. Progression toward goals:  [X]       Improving appropriately and progressing toward goals  [ ]       Improving slowly and progressing toward goals  [ ]       Not making progress toward goals and plan of care will be adjusted       PLAN:  Patient continues to benefit from skilled intervention to address the above impairments. Continue treatment per established plan of care. Discharge Recommendations:  TBD  Further Equipment Recommendations for Discharge: TBD       SUBJECTIVE:   Patient stated I can sit on that toilet ok      OBJECTIVE DATA SUMMARY:   Cognitive/Behavioral Status:  Neurologic State: Alert  Orientation Level: Oriented X4   Cognition: decreased safety awareness           Functional Mobility and Transfers for ADLs:  Bed Mobility:  Supine to Sit: Supervision     Transfers:     Functional Transfers  Bathroom Mobility: Contact guard assistance using RW for mobility     Balance:   standing: impaired, requires RW for mobility     ADL Intervention:         Patient ambulated to bathroom and from chair> door 2x. CGA for safety secondary to decreased balacne and safety awareness. Home Safety: Patient pulls up on RW when transferring to standing position and picks up entire RW when turning quickly. Therapist reviewed proper technique & hand placement for sit<> stand as well as for turning with RW. Patient then performed turn with RW safely but needs further review on RW use and safe technqiue.                Pain:  Pain Scale 1: Numeric (0 - 10)  Pain Intensity 1: 0           Pain Intervention(s) 1: Medication (see MAR) (Schedule Pain medication for Chronic Pain)  Activity Tolerance:   Good      Please refer to the flowsheet for vital signs taken during this treatment.   After treatment:   [X] Patient left in no apparent distress sitting up in chair  [ ] Patient left in no apparent distress in bed  [X] Call bell left within reach  [ ] Nursing notified  [ ] Caregiver present  [X] chair alarm activated      COMMUNICATION/COLLABORATION:   The patients plan of care was discussed with: Physical Therapist and Registered Nurse     Pratima Benavidez OT  Time Calculation: 20 mins

## 2017-05-16 NOTE — PROGRESS NOTES
Problem: Pressure Injury - Risk of  Goal: *Prevention of pressure ulcer  Outcome: Progressing Towards Goal  Patient is without skin breakdown at this time. Patient is being encouraged to turn to prevent skin break down. Problem: Falls - Risk of  Goal: *Absence of falls  Outcome: Progressing Towards Goal  Patient is without falls at this time. Bed is locked and in lowest position. Call bell and bedside table are within reach. Goal: *Knowledge of fall prevention  Outcome: Progressing Towards Goal  Patient is encouraged to call when assistance is needed.         Problem: Heart Failure: Discharge Outcomes  Goal: *Describes available resources and support systems  (eg: Home Health, Palliative Care, Advanced Medical Directive)   Outcome: Progressing Towards Goal  Patient will be discharged to rehab
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
## 2017-05-16 NOTE — CARDIO/PULMONARY
Cardiac Wellness: MI education folder with catheterization brochure to bedside of Jasvir Whitlock. Educated using teach back method. Reviewed MI diagnosis definition and purpose of intervention. Identified pertinent CAD risk factors including family history, HTN, and dyslipidemia, and encouraged lifestyle modifications. Reviewed importance of medication compliance and follow up appointments with cardiologist.  Discussed signs and symptoms of angina and what to report to physician after discharge. Smoking history assessed. Patient is a pack per week smoker. Encouraged smoking cessation. Smoking cessation website added to AVS. Encouraged low sodium, heart healthy diet. Emphasized value of cardiac rehab, discussed Cardiac Wellness Program, and encouraged enrollment. Mr. Lizzie Hale stated he is interested in participating in cardiac rehab at [facility]. [facility] contact information placed on AVS.    Jasvir Whitlock verbalized understanding with questions answered.       Shannon Lyons RN

## 2017-05-16 NOTE — PROGRESS NOTES
Bedside and Verbal shift change report given to Neil Morley RN (oncoming nurse) by Shalonda Morris RN (offgoing nurse). Report included the following information SBAR, Kardex, MAR, Accordion, Recent Results and Cardiac Rhythm NSR.

## 2017-05-16 NOTE — PROGRESS NOTES
CM spoke with patient's wife late yesterday regarding transition of care to a Rehab facility. Wife offered a choice, she did not have a preference but would like her  to go to a facility closer to her home in [place], [place]. I told wife that I would search by their Zip Code for rehab/SN facilities in their area. CM has sent referrals to 6 facilities within patient's area. CM will continue to follow. Kyrie Moya MSA, RN, CRM.    1:08 pm. PIEDAD spoke with Brian Ford at [facility] regarding referral, CM faxed nursing notes as requested. Patient's wife notified that [facility] had no beds. Wife prefers [facility] as her second choice since it's closer to her home and she does not drive. CM waiting to hear from [facility]. Kyrie Moya MSA, RN, CRM.
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
## 2017-05-16 NOTE — PROGRESS NOTES
Infection Prevention RN, Junie, called and let CCL know the patient;s cultures were negative for ESBL. No longer on Isolation. Communicated to RN.

## 2017-05-16 NOTE — PROGRESS NOTES
Problem: Mobility Impaired (Adult and Pediatric)  Goal: *Acute Goals and Plan of Care (Insert Text)  Physical Therapy Goals  Initiated 5/12/2017  1. Patient will move from supine to sit and sit to supine in bed with modified independence within 7 day(s). 2. Patient will transfer from bed to chair and chair to bed with modified independence using the least restrictive device within 7 day(s). 3. Patient will perform sit to stand with modified independence within 7 day(s). 4. Patient will ambulate with modified independence for 250 feet with the least restrictive device within 7 day(s). 5. Patient will ascend/descend 4 stairs with 2 handrail(s) with modified independence within 7 day(s). PHYSICAL THERAPY TREATMENT  Patient: Pilo Andrews (41 y.o. male)  Date: 5/16/2017  Diagnosis: nstemi  NSTEMI (non-ST elevated myocardial infarction) [facility]) NSTEMI (non-ST elevated myocardial infarction) [facility])       Precautions: Fall, Contact, Bed Alarm, WBAT (EF 30-35%; acute care admit 5-3 for pneumonia- placed onVent)  Chart, physical therapy assessment, plan of care and goals were reviewed. ASSESSMENT:  Pt was able to increase gait tolerance and is mobilizing with CGA to SBA. Pt does require v.c for safety awareness. No c/o of SOB with gait. Pt can ambulate with nursing/family. Pt may benefit from SNF vs HHPT with 24/7 supervision   Progression toward goals:  [X]      Improving appropriately and progressing toward goals  [ ]      Improving slowly and progressing toward goals  [ ]      Not making progress toward goals and plan of care will be adjusted       PLAN:  Patient continues to benefit from skilled intervention to address the above impairments. Continue treatment per established plan of care. Discharge Recommendations:   SNF vs HHPT with 24/7 supervision   Further Equipment Recommendations for Discharge: Owns rolling walker       SUBJECTIVE:   Patient stated I don't know when I am leaving.       OBJECTIVE DATA SUMMARY:   Critical Behavior:  Neurologic State: Alert  Orientation Level: Oriented X4  Cognition: Appropriate decision making, Appropriate for age attention/concentration, Appropriate safety awareness  Safety/Judgement: Decreased insight into deficits, Decreased awareness of need for safety, Decreased awareness of need for assistance (\"I dont know why I'm not supposed to get up by myself. \")  Functional Mobility Training:  Bed Mobility:     Supine to Sit: Supervision                          Transfers:  Sit to Stand: Stand-by asssistance  Stand to Sit: Stand-by asssistance        Bed to Chair: Contact guard assistance                    Balance:  Sitting: Intact  Standing: Impaired  Standing - Static: Good  Standing - Dynamic : Fair  Ambulation/Gait Training:  Distance (ft): 200 Feet (ft)  Assistive Device: Gait belt;Walker, rolling  Ambulation - Level of Assistance: Stand-by asssistance;Contact guard assistance        Gait Abnormalities: Decreased step clearance        Base of Support: Narrowed     Speed/Velvet: Pace decreased (<100 feet/min)  Step Length: Left shortened;Right shortened                               Stairs:              Neuro Re-Education:     Therapeutic Exercises:      Pain:  Pain Scale 1: Numeric (0 - 10)  Pain Intensity 1: 0           Pain Intervention(s) 1: Medication (see MAR) (Schedule Pain medication for Chronic Pain)  Activity Tolerance:   Limited   Please refer to the flowsheet for vital signs taken during this treatment.   After treatment:   [X] Patient left in no apparent distress sitting up in chair  [ ] Patient left in no apparent distress in bed  [X] Call bell left within reach  [X] Nursing notified  [ ] Caregiver present  [X] Bed alarm activated      COMMUNICATION/COLLABORATION:   The patients plan of care was discussed with: Devora Nurse     Светлана Amezcua PTA   Time Calculation: 14 mins

## 2017-05-16 NOTE — ROUTINE PROCESS
Bedside and Verbal shift change report given to Marcia Gross (oncoming nurse) by Nirmal Nguyễn (offgoing nurse). Report included the following information SBAR, Kardex, Intake/Output, MAR, Recent Results and Cardiac Rhythm NSR.

## 2017-05-17 PROCEDURE — 97116 GAIT TRAINING THERAPY: CPT

## 2017-05-17 PROCEDURE — 74011250637 HC RX REV CODE- 250/637: Performed by: FAMILY MEDICINE

## 2017-05-17 PROCEDURE — 74011250637 HC RX REV CODE- 250/637: Performed by: PHYSICAL MEDICINE & REHABILITATION

## 2017-05-17 PROCEDURE — 74011250637 HC RX REV CODE- 250/637: Performed by: INTERNAL MEDICINE

## 2017-05-17 PROCEDURE — 74011250636 HC RX REV CODE- 250/636: Performed by: INTERNAL MEDICINE

## 2017-05-17 PROCEDURE — 97532 HC OT COGNITIVE SKILL DEV 15 MIN: CPT

## 2017-05-17 PROCEDURE — 65270000029 HC RM PRIVATE

## 2017-05-17 RX ADMIN — ENOXAPARIN SODIUM 40 MG: 40 INJECTION SUBCUTANEOUS at 17:54

## 2017-05-17 RX ADMIN — MORPHINE SULFATE 75 MG: 60 TABLET, EXTENDED RELEASE ORAL at 08:47

## 2017-05-17 RX ADMIN — ATORVASTATIN CALCIUM 40 MG: 40 TABLET, FILM COATED ORAL at 17:54

## 2017-05-17 RX ADMIN — Medication 1 CAPSULE: at 08:47

## 2017-05-17 RX ADMIN — CARVEDILOL 12.5 MG: 12.5 TABLET, FILM COATED ORAL at 08:47

## 2017-05-17 RX ADMIN — CARVEDILOL 12.5 MG: 12.5 TABLET, FILM COATED ORAL at 17:53

## 2017-05-17 RX ADMIN — MORPHINE SULFATE 100 MG: 100 TABLET, EXTENDED RELEASE ORAL at 22:36

## 2017-05-17 RX ADMIN — ASPIRIN 81 MG CHEWABLE TABLET 81 MG: 81 TABLET CHEWABLE at 08:46

## 2017-05-17 RX ADMIN — Medication 10 ML: at 06:30

## 2017-05-17 RX ADMIN — Medication 10 ML: at 22:36

## 2017-05-17 RX ADMIN — FUROSEMIDE 40 MG: 40 TABLET ORAL at 08:47

## 2017-05-17 NOTE — PROGRESS NOTES
Problem: Pressure Injury - Risk of  Goal: *Prevention of pressure ulcer  Outcome: Progressing Towards Goal  Blanchable area on his bottom we are monitoring

## 2017-05-17 NOTE — PROGRESS NOTES
Problem: Falls - Risk of  Goal: *Absence of falls  Outcome: Progressing Towards Goal  PT working with PT today and ambulates in hallway with walker

## 2017-05-17 NOTE — PROGRESS NOTES
TRANSFER - IN REPORT:    Verbal report received from [place], RN(name) on ALONZO Briones  being received from PS(unit) for routine progression of care      Report consisted of patients Situation, Background, Assessment and   Recommendations(SBAR). Information from the following report(s) SBAR, Kardex, Procedure Summary, Intake/Output, MAR, Recent Results and Med Rec Status was reviewed with the receiving nurse. Opportunity for questions and clarification was provided. Assessment completed upon patients arrival to unit and care assumed.

## 2017-05-17 NOTE — PROGRESS NOTES
Bedside and Verbal shift change report given to Hannah Garay (oncoming nurse) by Barbara Nolan RN (offgoing nurse). Report included the following information SBAR, Intake/Output, MAR, Recent Results and Cardiac Rhythm NSR.

## 2017-05-17 NOTE — PROGRESS NOTES
TRANSFER - OUT REPORT:    Verbal report given to [address] [place] [address] on ALONZO Briones  being transferred to ([address]) room 617 for routine progression of care       Report consisted of patients Situation, Background, Assessment and   Recommendations(SBAR). Information from the following report(s) SBAR, Kardex, [facility] and Cardiac Rhythm SR was reviewed with the receiving nurse. Lines:   Peripheral IV 05/15/17 Left Wrist (Active)   Site Assessment Clean, dry, & intact 5/17/2017  9:13 AM   Phlebitis Assessment 0 5/17/2017  9:13 AM   Infiltration Assessment 0 5/17/2017  9:13 AM   Dressing Status Clean, dry, & intact 5/17/2017  9:13 AM   Dressing Type Tape;Transparent 5/17/2017  9:13 AM   Hub Color/Line Status Blue;Capped 5/17/2017  9:13 AM   Action Taken Open ports on tubing capped 5/17/2017  9:13 AM   Alcohol Cap Used Yes 5/17/2017  9:13 AM        Opportunity for questions and clarification was provided. Patient transported with:Patient belongings& chart sent. Pt transferred via wheelchair by transporter. Wife notified.
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
## 2017-05-17 NOTE — PROGRESS NOTES
Problem: Self Care Deficits Care Plan (Adult)  Goal: *Acute Goals and Plan of Care (Insert Text)  Occupational Therapy Goals  Initiated 5/15/2017  1. Patient will perform grooming in standing VSS with minimal assistance/contact guard assist within 7 day(s). 2. Patient will perform bathing with supervision/set-up within 7 day(s). 3. Patient will perform upper body dressing and lower body dressing with supervision/set-up VSS within 7 day(s). 4. Patient will perform toilet transfers VSS with minimal assistance/contact guard assist within 7 day(s). 5. Patient will perform all aspects of toileting VSS with minimal assistance/contact guard assist within 7 day(s). 6. Patient will participate in upper extremity therapeutic exercise/activities with modified independence VSS for 10 minutes within 7 day(s). 7. Patient will utilize energy conservation and safety techniques during functional activities with verbal, visual and tactile cues within 7 day(s). OCCUPATIONAL THERAPY TREATMENT  Patient: Bridgette Key (89 y.o. male)  Date: 5/17/2017  Diagnosis: nstemi  NSTEMI (non-ST elevated myocardial infarction) [facility]) NSTEMI (non-ST elevated myocardial infarction) [facility])       Precautions: Fall, Contact, Bed Alarm, WBAT (EF 30-35%; acute care admit 5-3 for pneumonia- placed onVent)  Chart, occupational therapy assessment, plan of care, and goals were reviewed. ASSESSMENT:  Cleared by nurse. Received in bed. Vitals stable. Patient moved supine to sit with supervision. He sat EOB to complete the [facility] Cognitive Assessment [facility]). The [facility] is a rapid screening assessment for mild cognitive dysfunction. It assesses cognitive domains (attention, concentration, executive function, memory, language, visuo-cognitive skills, conceptual thinking, calculation, and orientation).  Per normative data, a score of 26/30 or above is considered normal. Mr Farshad Asif scored a 14/30 with errors in executive function, memory, visuo-cognitive skills, conceptual thinking, calculation and orientation. See completed assessment on paper chart. Noted that patient discharging home with wife per chart. Recommend 24/7 supervision for safety. Progression toward goals:  [X]          Improving appropriately and progressing toward goals  [ ]          Improving slowly and progressing toward goals  [ ]          Not making progress toward goals and plan of care will be adjusted       PLAN:  Patient continues to benefit from skilled intervention to address the above impairments. Continue treatment per established plan of care. Discharge Recommendations:  None  Further Equipment Recommendations for Discharge:  none       SUBJECTIVE:   Patient stated [facility] when asked the name of this place      OBJECTIVE DATA SUMMARY:   Cognitive/Behavioral Status:  Neurologic State: Alert  Orientation Level: Oriented to person;Disoriented to place; Disoriented to time  Cognition: Follows commands;Decreased attention/concentration  Perception: Appears intact  Perseveration: No perseveration noted  Safety/Judgement: Decreased awareness of environment  Functional Mobility and Transfers for ADLs:              Bed Mobility:                            Transfers:  Sit to Stand: Modified independent        Balance:  Sitting: Intact  Standing: Intact; With support  Standing - Static: Good  Standing - Dynamic : Good  ADL Intervention:     Cognitive Retraining  Safety/Judgement: Decreased awareness of environment  MOCA score: 14/30     Activity Tolerance:    Fair  Please refer to the flowsheet for vital signs taken during this treatment.   After treatment:   [X]  Patient left in no apparent distress sitting up in chair  [ ]  Patient left in no apparent distress in bed  [X]  Call bell left within reach  [X]  Nursing notified  [ ]  Caregiver present  [X]  Bed alarm activated      COMMUNICATION/COLLABORATION:   The patients plan of care was discussed with: Registered Nurse     LENA Subramanian  Time Calculation: 24 mins
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
## 2017-05-17 NOTE — PROGRESS NOTES
Bedside shift change report given to Maday Chinchilla RN (oncoming nurse) by Mary Alcaraz RN (offgoing nurse). Report included the following information SBAR.

## 2017-05-17 NOTE — PROGRESS NOTES
Problem: Mobility Impaired (Adult and Pediatric)  Goal: *Acute Goals and Plan of Care (Insert Text)  Physical Therapy Goals  Initiated 5/12/2017  1. Patient will move from supine to sit and sit to supine in bed with modified independence within 7 day(s). 2. Patient will transfer from bed to chair and chair to bed with modified independence using the least restrictive device within 7 day(s). 3. Patient will perform sit to stand with modified independence within 7 day(s). 4. Patient will ambulate with modified independence for 250 feet with the least restrictive device within 7 day(s). 5. Patient will ascend/descend 4 stairs with 2 handrail(s) with modified independence within 7 day(s). PHYSICAL THERAPY TREATMENT  Patient: Caro Farr (89 y.o. male)  Date: 5/17/2017  Diagnosis: nstemi  NSTEMI (non-ST elevated myocardial infarction) [facility]) NSTEMI (non-ST elevated myocardial infarction) [facility])       Precautions: Fall, Contact, Bed Alarm, WBAT (EF 30-35%; acute care admit 5-3 for pneumonia- placed onVent)      ASSESSMENT:  Pt cleared by nsg. Pt received sitting up in chair. Pt has been getting up and walking around unit per nsg. Pt now mod I in transfers. Gaits 250 feet with RW, gait characterized by mildly wide JAN with lateral trunk sway. Pt barely leaning on walker, and \"parked it\" near the wall to walk to chair with CGA. Pt then agreeable to ascend and descend 5 steps to verify if he could be cleared to home with HHPT rather than snf. Pt able to ascend and descend 5 steps with one rail, side stepping down, and CGA. Pt would benefit from HHPT and 24/7 supervision, which he has as wife is home as well.  spoke with CM and she is speaking with wife to see if she agrees. Pt in agreement to no SNF.     Progression toward goals:  [X]    Improving appropriately and progressing toward goals  [ ]    Improving slowly and progressing toward goals  [ ]    Not making progress toward goals and plan of care will be adjusted       PLAN:  Patient continues to benefit from skilled intervention to address the above impairments. Continue treatment per established plan of care. Discharge Recommendations:  Home Health  Further Equipment Recommendations for Discharge:  Has walker and canes at home       SUBJECTIVE:   Patient stated I'm feeling pretty good.       OBJECTIVE DATA SUMMARY:   Critical Behavior:  Neurologic State: Alert, Other (Comment) (forgetful)  Orientation Level: Disoriented to place, Oriented to person, Oriented to time  Cognition: Decreased attention/concentration, Decreased command following, Memory loss, Poor safety awareness  Safety/Judgement: Decreased insight into deficits, Decreased awareness of need for safety, Decreased awareness of need for assistance (\"I dont know why I'm not supposed to get up by myself. \")  Functional Mobility Training:  Bed Mobility:      not tested, pt up in chair. Transfers:  Sit to Stand: Modified independent      Balance:  Sitting: Intact  Standing: Intact; With support  Standing - Static: Good  Standing - Dynamic : Good  Ambulation/Gait Training:  Distance (ft): 250 Feet (ft)  Assistive Device: Gait belt;Walker, rolling  Ambulation - Level of Assistance: Supervision         Base of Support: Widened (mild wide)     Speed/Velvet: Pace decreased (<100 feet/min)  Step Length: Right shortened;Left shortened               Stairs:      up and down 5 steps with one rail and sidestep  Pain:  Pain Scale 1: Numeric (0 - 10)  Pain Intensity 1: 0      Activity Tolerance:   Pt with improved tolerance for gait. Please refer to the flowsheet for vital signs taken during this treatment.   After treatment:   [X]    Patient left in no apparent distress sitting up in chair  [ ]    Patient left in no apparent distress in bed  [X]    Call bell left within reach  [X]    Nursing notified  [ ]    Caregiver present  [X]    Bed alarm activated      COMMUNICATION/COLLABORATION:   The patients plan of care was discussed with: Registered Nurse and      Cristina Taylor, PT   Time Calculation: 12 mins

## 2017-05-17 NOTE — PROGRESS NOTES
CM noted patient not accepted at the [facility] [address] [place] [address]. CM spoke with [facility], they had not evaluated patient as the wife had told Willim Dakins, Liaison to hold off evaluating him as she was trying to get her  into a SNF closer to [place], [place]. CM will continue to follow for transition of care. Wife will need to arrange for transportation as she does not drive and lives over 70 miles away. Patient is able to ride in a car. Ramila Charles MSA, RN, CRM.    2:00 pm. Received many calls from wife, CM informed wife that CM has not been able to place patient to a SNF but PT has recommended home PT. Wife declined home PT. She states if patient is stable enough to come home , she would make sure that he ambulates many times a day. She also states that she could not get transportation arranged as planned therefore she asked that this CM arranged for a taxi cab to bring her  home tomorrow. Bedside nurse Kaz Rosenberg RN agreed that patient could travel by a taxi home. [facility] for 11:00 am tomorrow [facility] at [phone], they were instructed to call [phone] at 10:00 am to inform the floor of  time. CM called wife to inform her that the [employer] will transport patient home tomorrow at 11:00 am and they would charge $150.00 for the trip. Wife was agreeable to the  time and cost of trip. No further discharge needs identified. CM will continue to follow for any other discharge needs.  Ramila Charles MSA, RN, CRM

## 2017-05-18 VITALS
HEIGHT: 66 IN | TEMPERATURE: 98.6 F | BODY MASS INDEX: 32.27 KG/M2 | HEART RATE: 99 BPM | RESPIRATION RATE: 18 BRPM | OXYGEN SATURATION: 97 % | SYSTOLIC BLOOD PRESSURE: 156 MMHG | WEIGHT: 200.8 LBS | DIASTOLIC BLOOD PRESSURE: 110 MMHG

## 2017-05-18 PROBLEM — I21.4 NSTEMI (NON-ST ELEVATED MYOCARDIAL INFARCTION) (HCC): Status: RESOLVED | Noted: 2017-05-08 | Resolved: 2017-05-18

## 2017-05-18 PROBLEM — J18.9 PNEUMONIA: Status: RESOLVED | Noted: 2017-05-08 | Resolved: 2017-05-18

## 2017-05-18 PROBLEM — T40.601A OVERDOSE OPIATE (HCC): Status: RESOLVED | Noted: 2017-05-08 | Resolved: 2017-05-18

## 2017-05-18 PROCEDURE — 74011250637 HC RX REV CODE- 250/637: Performed by: INTERNAL MEDICINE

## 2017-05-18 PROCEDURE — 74011250637 HC RX REV CODE- 250/637: Performed by: FAMILY MEDICINE

## 2017-05-18 PROCEDURE — 74011250637 HC RX REV CODE- 250/637: Performed by: PHYSICAL MEDICINE & REHABILITATION

## 2017-05-18 RX ORDER — ATORVASTATIN CALCIUM 40 MG/1
40 TABLET, FILM COATED ORAL EVERY EVENING
Qty: 30 TAB | Refills: 0 | Status: SHIPPED | OUTPATIENT
Start: 2017-05-18

## 2017-05-18 RX ORDER — CARVEDILOL 12.5 MG/1
12.5 TABLET ORAL 2 TIMES DAILY WITH MEALS
Qty: 60 TAB | Refills: 0 | Status: ON HOLD | OUTPATIENT
Start: 2017-05-18 | End: 2017-05-26

## 2017-05-18 RX ORDER — FUROSEMIDE 40 MG/1
80 TABLET ORAL DAILY
Qty: 60 TAB | Refills: 0 | Status: ON HOLD | OUTPATIENT
Start: 2017-05-18 | End: 2017-05-26

## 2017-05-18 RX ORDER — POLYETHYLENE GLYCOL 3350 17 G/17G
17 POWDER, FOR SOLUTION ORAL DAILY
Qty: 100 PACKET | Refills: 0 | Status: SHIPPED | OUTPATIENT
Start: 2017-05-18 | End: 2018-01-09

## 2017-05-18 RX ORDER — GUAIFENESIN 100 MG/5ML
81 LIQUID (ML) ORAL DAILY
Qty: 100 TAB | Refills: 0 | Status: SHIPPED | OUTPATIENT
Start: 2017-05-18

## 2017-05-18 RX ADMIN — CARVEDILOL 12.5 MG: 12.5 TABLET, FILM COATED ORAL at 09:01

## 2017-05-18 RX ADMIN — Medication 1 CAPSULE: at 09:01

## 2017-05-18 RX ADMIN — MORPHINE SULFATE 75 MG: 60 TABLET, EXTENDED RELEASE ORAL at 09:01

## 2017-05-18 RX ADMIN — FUROSEMIDE 40 MG: 40 TABLET ORAL at 09:01

## 2017-05-18 RX ADMIN — ASPIRIN 81 MG CHEWABLE TABLET 81 MG: 81 TABLET CHEWABLE at 09:01

## 2017-05-18 NOTE — DISCHARGE INSTRUCTIONS
[facility] Smoking Cessation Program: Below you will find a link to a text/email based smoking cessation program. The program is individualized to meet the patient's needs. To enroll use this link [email or web address]. Discharge Instructions       PATIENT ID: Eugenio Isaacs  MRN: [id number]   YOB: 1955    DATE OF ADMISSION: 5/8/2017  6:55 PM    DATE OF DISCHARGE: 5/18/2017    PRIMARY CARE PROVIDER: Madison Lr     ATTENDING PHYSICIAN: Francine Murillo MD  DISCHARGING PROVIDER: Francine Murillo MD    To contact this individual call [phone] and ask the  to page. If unavailable ask to be transferred the Adult Hospitalist Department. DISCHARGE DIAGNOSES PNA,      CONSULTATIONS: IP CONSULT TO INTENSIVIST  IP CONSULT TO INFECTIOUS DISEASES  IP CONSULT TO PULMONOLOGY    PROCEDURES/SURGERIES: * No surgery found *    PENDING TEST RESULTS:   At the time of discharge the following test results are still pending: na    FOLLOW UP APPOINTMENTS:   Follow-up Information     Follow up With Details Comments Contact Info    [facility] 2 CARDIOPULMONARY CARE Call in 1 week enrollment in cardiac rehab [address] [phone]  [address] [place] [address] In 1 week f/u for heart failure and for general medical as well as recent PNA [id number] [facility] 6745 [id number]             ADDITIONAL CARE RECOMMENDATIONS: na    DIET: Cardiac Diet     ACTIVITY: Activity as tolerated    WOUND CARE: na    EQUIPMENT needed: na      DISCHARGE MEDICATIONS:   See Medication Reconciliation Form    · It is important that you take the medication exactly as they are prescribed. · Keep your medication in the bottles provided by the pharmacist and keep a list of the medication names, dosages, and times to be taken in your wallet. · Do not take other medications without consulting your doctor.        NOTIFY YOUR PHYSICIAN FOR ANY OF THE FOLLOWING:   Fever over 101 degrees for 24 hours. Chest pain, shortness of breath, fever, chills, nausea, vomiting, diarrhea, change in mentation, falling, weakness, bleeding. Severe pain or pain not relieved by medications. Or, any other signs or symptoms that you may have questions about.       DISPOSITION:    Home With:   OT  PT  HH  RN       SNF/Inpatient Rehab/LTAC   x Independent/assisted living    Hospice    Other:           Signed:   Rebeca Pardo MD  5/18/2017  8:36 AM

## 2017-05-18 NOTE — NURSE NAVIGATOR
[facility] noted discharge.   Post hospital followup appt scheduled for 5/22/17 @ 340PM.  Appt placed on AVS.  Belen Kimball RN-[facility]FN/[facility]

## 2017-05-18 NOTE — PROGRESS NOTES
Bedside shift change report given to Daniel Duarte RN (oncoming nurse) by Kathryn Monaco RN (offgoing nurse). Report included the following information SBAR, Kardex, Intake/Output, MAR, Recent Results and Med Rec Status.

## 2017-05-18 NOTE — PROGRESS NOTES
Primary Nurse Fabio Perry RN and Stacey Carr RN performed a dual skin assessment on this patient No impairment noted  Kavin score is 19. Skin intact, scattered bruises. Right hand dorsal discoloration noted. Inner buttocks dark areas noted but skin is intact and blanchable.

## 2017-05-18 NOTE — PROGRESS NOTES
TRANSFER - IN REPORT:    Verbal report received from Cynthia Jensen RN  on Kyree White  being received from [facility] FOR [facility] for progression of care. Report consisted of patients Situation, Background, Assessment and   Recommendations(SBAR). Opportunity for questions and clarification was provided. Patient has orders for discharge. Patient to be transported home via cab. Cab to  patient at 11:00 am.  RN notified of transport time. There are no other CM consults or needs at this time.     SILVIA Goodman/EVE  9:12 AM
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
## 2017-05-18 NOTE — PROGRESS NOTES
..Bedside shift change report given to Rhoda Choi RN (oncoming nurse) by Anatoliy Headley (offgoing nurse). Report included the following information SBAR, Kardex and MAR.

## 2017-05-19 ENCOUNTER — TELEPHONE (OUTPATIENT)
Dept: CARDIAC REHAB | Age: 62
End: 2017-05-19

## 2017-05-19 NOTE — TELEPHONE ENCOUNTER
5/19/2017 Cardiac Wellness: Called Mr. Noemi Duron  to discuss participation in the Cardiac Wellness Program following a NSTEMI on 5/9/17. Left voicemail message at [phone] with the number to [facility] cardiac rehab facility.  Jenn Kimbrough RN

## 2017-05-21 ENCOUNTER — HOSPITAL ENCOUNTER (INPATIENT)
Age: 62
LOS: 5 days | Discharge: HOME | DRG: 917 | End: 2017-05-26
Attending: EMERGENCY MEDICINE | Admitting: FAMILY MEDICINE
Payer: MEDICARE

## 2017-05-21 ENCOUNTER — APPOINTMENT (OUTPATIENT)
Dept: GENERAL RADIOLOGY | Age: 62
DRG: 917 | End: 2017-05-21
Attending: EMERGENCY MEDICINE
Payer: MEDICARE

## 2017-05-21 DIAGNOSIS — J96.02 ACUTE RESPIRATORY FAILURE WITH HYPERCAPNIA (HCC): Primary | ICD-10-CM

## 2017-05-21 DIAGNOSIS — R77.8 ELEVATED TROPONIN: ICD-10-CM

## 2017-05-21 DIAGNOSIS — R45.1 AGITATION: ICD-10-CM

## 2017-05-21 DIAGNOSIS — G89.4 CHRONIC PAIN SYNDROME: ICD-10-CM

## 2017-05-21 DIAGNOSIS — R06.02 SHORTNESS OF BREATH: ICD-10-CM

## 2017-05-21 DIAGNOSIS — T50.904A DRUG OVERDOSE, UNDETERMINED INTENT, INITIAL ENCOUNTER: ICD-10-CM

## 2017-05-21 DIAGNOSIS — M54.2 NECK PAIN: ICD-10-CM

## 2017-05-21 DIAGNOSIS — R53.83 FATIGUE, UNSPECIFIED TYPE: ICD-10-CM

## 2017-05-21 DIAGNOSIS — F11.23 OPIOID DEPENDENCE WITH WITHDRAWAL (HCC): ICD-10-CM

## 2017-05-21 PROBLEM — J96.00 ACUTE RESPIRATORY FAILURE (HCC): Status: ACTIVE | Noted: 2017-05-21

## 2017-05-21 PROBLEM — T50.901A DRUG OVERDOSE: Status: ACTIVE | Noted: 2017-05-21

## 2017-05-21 PROBLEM — N17.9 ACUTE RENAL FAILURE (ARF) (HCC): Status: ACTIVE | Noted: 2017-05-21

## 2017-05-21 LAB
ALBUMIN SERPL BCP-MCNC: 2.9 G/DL (ref 3.5–5)
ALBUMIN/GLOB SERPL: 0.8 {RATIO} (ref 1.1–2.2)
ALP SERPL-CCNC: 85 U/L (ref 45–117)
ALT SERPL-CCNC: 38 U/L (ref 12–78)
AMPHET UR QL SCN: NEGATIVE
ANION GAP BLD CALC-SCNC: 8 MMOL/L (ref 5–15)
APAP SERPL-MCNC: 4 UG/ML (ref 10–30)
APPEARANCE UR: ABNORMAL
APTT PPP: 25.4 SEC (ref 22.1–32.5)
ARTERIAL PATENCY WRIST A: ABNORMAL
ARTERIAL PATENCY WRIST A: NO
ARTERIAL PATENCY WRIST A: YES
AST SERPL W P-5'-P-CCNC: 43 U/L (ref 15–37)
BACTERIA URNS QL MICRO: NEGATIVE /HPF
BARBITURATES UR QL SCN: NEGATIVE
BASE DEFICIT BLD-SCNC: 4 MMOL/L
BASE DEFICIT BLD-SCNC: 6 MMOL/L
BASE DEFICIT BLDV-SCNC: 3 MMOL/L
BASOPHILS # BLD AUTO: 0 K/UL (ref 0–0.1)
BASOPHILS # BLD: 0 % (ref 0–1)
BDY SITE: ABNORMAL
BENZODIAZ UR QL: POSITIVE
BILIRUB SERPL-MCNC: 0.4 MG/DL (ref 0.2–1)
BILIRUB UR QL: NEGATIVE
BUN SERPL-MCNC: 35 MG/DL (ref 6–20)
BUN/CREAT SERPL: 9 (ref 12–20)
CALCIUM SERPL-MCNC: 7.9 MG/DL (ref 8.5–10.1)
CANNABINOIDS UR QL SCN: NEGATIVE
CAOX CRY URNS QL MICRO: ABNORMAL
CHLORIDE SERPL-SCNC: 105 MMOL/L (ref 97–108)
CK MB CFR SERPL CALC: 1.4 % (ref 0–2.5)
CK MB SERPL-MCNC: 12.9 NG/ML (ref 5–25)
CK SERPL-CCNC: 922 U/L (ref 39–308)
CO2 SERPL-SCNC: 25 MMOL/L (ref 21–32)
COCAINE UR QL SCN: NEGATIVE
COLOR UR: ABNORMAL
CREAT SERPL-MCNC: 3.94 MG/DL (ref 0.7–1.3)
DRUG SCRN COMMENT,DRGCM: ABNORMAL
EOSINOPHIL # BLD: 0.2 K/UL (ref 0–0.4)
EOSINOPHIL NFR BLD: 2 % (ref 0–7)
EPITH CASTS URNS QL MICRO: ABNORMAL /LPF
ERYTHROCYTE [DISTWIDTH] IN BLOOD BY AUTOMATED COUNT: 15.3 % (ref 11.5–14.5)
GAS FLOW.O2 O2 DELIVERY SYS: ABNORMAL L/MIN
GLOBULIN SER CALC-MCNC: 3.8 G/DL (ref 2–4)
GLUCOSE SERPL-MCNC: 106 MG/DL (ref 65–100)
GLUCOSE UR STRIP.AUTO-MCNC: NEGATIVE MG/DL
HCO3 BLD-SCNC: 22.4 MMOL/L (ref 22–26)
HCO3 BLD-SCNC: 24.1 MMOL/L (ref 22–26)
HCO3 BLDV-SCNC: 25.3 MMOL/L (ref 23–28)
HCT VFR BLD AUTO: 39.6 % (ref 36.6–50.3)
HGB BLD-MCNC: 12.5 G/DL (ref 12.1–17)
HGB UR QL STRIP: NEGATIVE
INR PPP: 1 (ref 0.9–1.1)
KETONES UR QL STRIP.AUTO: NEGATIVE MG/DL
LEUKOCYTE ESTERASE UR QL STRIP.AUTO: NEGATIVE
LYMPHOCYTES # BLD AUTO: 18 % (ref 12–49)
LYMPHOCYTES # BLD: 1.9 K/UL (ref 0.8–3.5)
MCH RBC QN AUTO: 29.8 PG (ref 26–34)
MCHC RBC AUTO-ENTMCNC: 31.6 G/DL (ref 30–36.5)
MCV RBC AUTO: 94.3 FL (ref 80–99)
METHADONE UR QL: NEGATIVE
MONOCYTES # BLD: 1 K/UL (ref 0–1)
MONOCYTES NFR BLD AUTO: 10 % (ref 5–13)
MYOGLOBIN UR QL: NEGATIVE
NEUTS SEG # BLD: 7.2 K/UL (ref 1.8–8)
NEUTS SEG NFR BLD AUTO: 70 % (ref 32–75)
NITRITE UR QL STRIP.AUTO: NEGATIVE
O2/TOTAL GAS SETTING VFR VENT: 0.4 %
O2/TOTAL GAS SETTING VFR VENT: 0.4 %
O2/TOTAL GAS SETTING VFR VENT: 40 %
OPIATES UR QL: POSITIVE
PCO2 BLD: 56.7 MMHG (ref 35–45)
PCO2 BLD: 66.8 MMHG (ref 35–45)
PCO2 BLDV: 69.1 MMHG (ref 41–51)
PCP UR QL: NEGATIVE
PEEP RESPIRATORY: 6 CMH2O
PH BLD: 7.17 [PH] (ref 7.35–7.45)
PH BLD: 7.2 [PH] (ref 7.35–7.45)
PH BLDV: 7.17 [PH] (ref 7.32–7.42)
PH UR STRIP: 5 [PH] (ref 5–8)
PIP ISTAT,IPIP: 12
PIP ISTAT,IPIP: 14
PIP ISTAT,IPIP: 15
PLATELET # BLD AUTO: 326 K/UL (ref 150–400)
PO2 BLD: 90 MMHG (ref 80–100)
PO2 BLD: 96 MMHG (ref 80–100)
PO2 BLDV: 33 MMHG (ref 25–40)
POTASSIUM SERPL-SCNC: 4.5 MMOL/L (ref 3.5–5.1)
PRESSURE SUPPORT SETTING VENT: 6 CMH2O
PRESSURE SUPPORT SETTING VENT: 6 CMH2O
PROT SERPL-MCNC: 6.7 G/DL (ref 6.4–8.2)
PROT UR STRIP-MCNC: ABNORMAL MG/DL
PROTHROMBIN TIME: 10.4 SEC (ref 9–11.1)
RBC # BLD AUTO: 4.2 M/UL (ref 4.1–5.7)
RBC #/AREA URNS HPF: ABNORMAL /HPF (ref 0–5)
SALICYLATES SERPL-MCNC: 2.1 MG/DL (ref 2.8–20)
SAO2 % BLD: 94 % (ref 92–97)
SAO2 % BLD: 95 % (ref 92–97)
SAO2 % BLDV: 47 % (ref 65–88)
SODIUM SERPL-SCNC: 138 MMOL/L (ref 136–145)
SP GR UR REFRACTOMETRY: 1.02 (ref 1–1.03)
SPECIMEN TYPE: ABNORMAL
THERAPEUTIC RANGE,PTTT: NORMAL SECS (ref 58–77)
TOTAL RESP. RATE, ITRR: 21
TOTAL RESP. RATE, ITRR: 31
TROPONIN I SERPL-MCNC: 0.24 NG/ML
UA: UC IF INDICATED,UAUC: ABNORMAL
UROBILINOGEN UR QL STRIP.AUTO: 0.2 EU/DL (ref 0.2–1)
WBC # BLD AUTO: 10.4 K/UL (ref 4.1–11.1)
WBC URNS QL MICRO: ABNORMAL /HPF (ref 0–4)

## 2017-05-21 PROCEDURE — 36415 COLL VENOUS BLD VENIPUNCTURE: CPT | Performed by: EMERGENCY MEDICINE

## 2017-05-21 PROCEDURE — 85025 COMPLETE CBC W/AUTO DIFF WBC: CPT | Performed by: EMERGENCY MEDICINE

## 2017-05-21 PROCEDURE — 81002 URINALYSIS NONAUTO W/O SCOPE: CPT | Performed by: FAMILY MEDICINE

## 2017-05-21 PROCEDURE — 86803 HEPATITIS C AB TEST: CPT | Performed by: FAMILY MEDICINE

## 2017-05-21 PROCEDURE — 96361 HYDRATE IV INFUSION ADD-ON: CPT

## 2017-05-21 PROCEDURE — 86225 DNA ANTIBODY NATIVE: CPT | Performed by: FAMILY MEDICINE

## 2017-05-21 PROCEDURE — 86160 COMPLEMENT ANTIGEN: CPT | Performed by: FAMILY MEDICINE

## 2017-05-21 PROCEDURE — 83516 IMMUNOASSAY NONANTIBODY: CPT | Performed by: FAMILY MEDICINE

## 2017-05-21 PROCEDURE — 84484 ASSAY OF TROPONIN QUANT: CPT | Performed by: EMERGENCY MEDICINE

## 2017-05-21 PROCEDURE — 94660 CPAP INITIATION&MGMT: CPT

## 2017-05-21 PROCEDURE — 86215 DEOXYRIBONUCLEASE ANTIBODY: CPT | Performed by: FAMILY MEDICINE

## 2017-05-21 PROCEDURE — 80307 DRUG TEST PRSMV CHEM ANLYZR: CPT | Performed by: EMERGENCY MEDICINE

## 2017-05-21 PROCEDURE — 86256 FLUORESCENT ANTIBODY TITER: CPT | Performed by: FAMILY MEDICINE

## 2017-05-21 PROCEDURE — 74011250636 HC RX REV CODE- 250/636: Performed by: FAMILY MEDICINE

## 2017-05-21 PROCEDURE — 51702 INSERT TEMP BLADDER CATH: CPT

## 2017-05-21 PROCEDURE — 82803 BLOOD GASES ANY COMBINATION: CPT

## 2017-05-21 PROCEDURE — 77030012879 HC MSK CPAP FLL FAC PHIL -B

## 2017-05-21 PROCEDURE — 74011250636 HC RX REV CODE- 250/636: Performed by: EMERGENCY MEDICINE

## 2017-05-21 PROCEDURE — 81001 URINALYSIS AUTO W/SCOPE: CPT | Performed by: FAMILY MEDICINE

## 2017-05-21 PROCEDURE — 96374 THER/PROPH/DIAG INJ IV PUSH: CPT

## 2017-05-21 PROCEDURE — 77030034848

## 2017-05-21 PROCEDURE — 86038 ANTINUCLEAR ANTIBODIES: CPT | Performed by: FAMILY MEDICINE

## 2017-05-21 PROCEDURE — 82553 CREATINE MB FRACTION: CPT | Performed by: EMERGENCY MEDICINE

## 2017-05-21 PROCEDURE — 71010 XR CHEST PORT: CPT

## 2017-05-21 PROCEDURE — 99285 EMERGENCY DEPT VISIT HI MDM: CPT

## 2017-05-21 PROCEDURE — 65610000006 HC RM INTENSIVE CARE

## 2017-05-21 PROCEDURE — 36600 WITHDRAWAL OF ARTERIAL BLOOD: CPT

## 2017-05-21 PROCEDURE — 82550 ASSAY OF CK (CPK): CPT | Performed by: EMERGENCY MEDICINE

## 2017-05-21 PROCEDURE — 85610 PROTHROMBIN TIME: CPT | Performed by: FAMILY MEDICINE

## 2017-05-21 PROCEDURE — 86706 HEP B SURFACE ANTIBODY: CPT | Performed by: FAMILY MEDICINE

## 2017-05-21 PROCEDURE — 93005 ELECTROCARDIOGRAM TRACING: CPT

## 2017-05-21 PROCEDURE — 80053 COMPREHEN METABOLIC PANEL: CPT | Performed by: EMERGENCY MEDICINE

## 2017-05-21 PROCEDURE — 82595 ASSAY OF CRYOGLOBULIN: CPT | Performed by: FAMILY MEDICINE

## 2017-05-21 PROCEDURE — 85730 THROMBOPLASTIN TIME PARTIAL: CPT | Performed by: FAMILY MEDICINE

## 2017-05-21 PROCEDURE — 77030032490 HC SLV COMPR SCD KNE COVD -B

## 2017-05-21 RX ORDER — MIDAZOLAM HYDROCHLORIDE 1 MG/ML
2 INJECTION, SOLUTION INTRAMUSCULAR; INTRAVENOUS ONCE
Status: ACTIVE | OUTPATIENT
Start: 2017-05-21 | End: 2017-05-22

## 2017-05-21 RX ORDER — SODIUM CHLORIDE 0.9 % (FLUSH) 0.9 %
5-10 SYRINGE (ML) INJECTION EVERY 8 HOURS
Status: DISCONTINUED | OUTPATIENT
Start: 2017-05-21 | End: 2017-05-26 | Stop reason: HOSPADM

## 2017-05-21 RX ORDER — NALOXONE HYDROCHLORIDE 1 MG/ML
0.4 INJECTION INTRAMUSCULAR; INTRAVENOUS; SUBCUTANEOUS ONCE
Status: COMPLETED | OUTPATIENT
Start: 2017-05-21 | End: 2017-05-21

## 2017-05-21 RX ORDER — NALOXONE HYDROCHLORIDE 0.4 MG/ML
0.4 INJECTION, SOLUTION INTRAMUSCULAR; INTRAVENOUS; SUBCUTANEOUS AS NEEDED
Status: COMPLETED | OUTPATIENT
Start: 2017-05-21 | End: 2017-05-22

## 2017-05-21 RX ORDER — SODIUM CHLORIDE 9 MG/ML
75 INJECTION, SOLUTION INTRAVENOUS CONTINUOUS
Status: DISCONTINUED | OUTPATIENT
Start: 2017-05-22 | End: 2017-05-23

## 2017-05-21 RX ORDER — NALOXONE HYDROCHLORIDE 1 MG/ML
0.2 INJECTION INTRAMUSCULAR; INTRAVENOUS; SUBCUTANEOUS
Status: COMPLETED | OUTPATIENT
Start: 2017-05-21 | End: 2017-05-21

## 2017-05-21 RX ORDER — SODIUM CHLORIDE 0.9 % (FLUSH) 0.9 %
5-10 SYRINGE (ML) INJECTION AS NEEDED
Status: DISCONTINUED | OUTPATIENT
Start: 2017-05-21 | End: 2017-05-26 | Stop reason: HOSPADM

## 2017-05-21 RX ORDER — NALOXONE HYDROCHLORIDE 1 MG/ML
1 INJECTION INTRAMUSCULAR; INTRAVENOUS; SUBCUTANEOUS ONCE
Status: DISCONTINUED | OUTPATIENT
Start: 2017-05-22 | End: 2017-05-21

## 2017-05-21 RX ORDER — HEPARIN SODIUM 5000 [USP'U]/ML
5000 INJECTION, SOLUTION INTRAVENOUS; SUBCUTANEOUS EVERY 8 HOURS
Status: DISCONTINUED | OUTPATIENT
Start: 2017-05-21 | End: 2017-05-26 | Stop reason: HOSPADM

## 2017-05-21 RX ORDER — LORAZEPAM 2 MG/ML
1 INJECTION INTRAMUSCULAR
Status: DISCONTINUED | OUTPATIENT
Start: 2017-05-21 | End: 2017-05-21

## 2017-05-21 RX ADMIN — Medication 10 ML: at 22:33

## 2017-05-21 RX ADMIN — Medication 10 ML: at 22:32

## 2017-05-21 RX ADMIN — HEPARIN SODIUM 5000 UNITS: 5000 INJECTION, SOLUTION INTRAVENOUS; SUBCUTANEOUS at 22:40

## 2017-05-21 RX ADMIN — SODIUM CHLORIDE 75 ML/HR: 900 INJECTION, SOLUTION INTRAVENOUS at 23:56

## 2017-05-21 RX ADMIN — NALOXONE HYDROCHLORIDE 0.2 MG: 1 INJECTION PARENTERAL at 21:10

## 2017-05-21 RX ADMIN — NALOXONE HYDROCHLORIDE 0.4 MG: 1 INJECTION PARENTERAL at 19:44

## 2017-05-21 RX ADMIN — SODIUM CHLORIDE 1000 ML: 900 INJECTION, SOLUTION INTRAVENOUS at 19:26
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
## 2017-05-21 NOTE — IP AVS SNAPSHOT
Current Discharge Medication List  
  
START taking these medications Dose & Instructions Dispensing Information Comments Morning Noon Evening Bedtime  
 amLODIPine 10 mg tablet Commonly known as:  David Randolph Your last dose was: Your next dose is:    
   
   
 Dose:  5 mg Take 0.5 Tabs by mouth daily. Quantity:  30 Tab Refills:  0  
     
   
   
   
  
 morphine CR 30 mg CR tablet Commonly known as:  MS CONTIN Replaces:  Morphine 100 mg ER capsule Your last dose was: Your next dose is:    
   
   
 Dose:  30 mg Take 1 Tab by mouth every eight (8) hours for 7 days. Max Daily Amount: 90 mg. Quantity:  21 Tab Refills:  0  
     
   
   
   
  
 senna-docusate 8.6-50 mg per tablet Commonly known as:  Randa Dull Your last dose was: Your next dose is:    
   
   
 Dose:  1 Tab Take 1 Tab by mouth daily. Quantity:  30 Tab Refills:  0 CONTINUE these medications which have CHANGED Dose & Instructions Dispensing Information Comments Morning Noon Evening Bedtime  
 carvedilol 12.5 mg tablet Commonly known as:  Sanchez Perez What changed:  how much to take Your last dose was: Your next dose is:    
   
   
 Dose:  25 mg Take 2 Tabs by mouth two (2) times daily (with meals). Quantity:  60 Tab Refills:  0  
     
   
   
   
  
 furosemide 40 mg tablet Commonly known as:  LASIX What changed:  how much to take Your last dose was: Your next dose is:    
   
   
 Dose:  40 mg Take 1 Tab by mouth daily. Quantity:  60 Tab Refills:  0 CONTINUE these medications which have NOT CHANGED Dose & Instructions Dispensing Information Comments Morning Noon Evening Bedtime  
 aspirin 81 mg chewable tablet Your last dose was: Your next dose is:    
   
   
 Dose:  81 mg Take 1 Tab by mouth daily. Quantity:  100 Tab Refills:  0  
     
   
   
   
  
 atorvastatin 40 mg tablet Commonly known as:  LIPITOR Your last dose was: Your next dose is:    
   
   
 Dose:  40 mg Take 1 Tab by mouth every evening. Quantity:  30 Tab Refills:  0 DULoxetine 60 mg capsule Commonly known as:  CYMBALTA Your last dose was: Your next dose is:    
   
   
 Dose:  60 mg Take 60 mg by mouth daily. Refills:  0  
     
   
   
   
  
 lansoprazole 30 mg capsule Commonly known as:  PREVACID Your last dose was: Your next dose is:    
   
   
 Dose:  30 mg Take 30 mg by mouth Daily (before breakfast). Refills:  0  
     
   
   
   
  
 polyethylene glycol 17 gram packet Commonly known as:  Viki Zavala Your last dose was: Your next dose is:    
   
   
 Dose:  17 g Take 1 Packet by mouth daily. Quantity:  100 Packet Refills:  0  
     
   
   
   
  
 ramipril 10 mg capsule Commonly known as:  ALTACE Your last dose was: Your next dose is:    
   
   
 Dose:  10 mg Take 10 mg by mouth daily. Refills:  0 STOP taking these medications   
 gabapentin 800 mg tablet Commonly known as:  NEURONTIN Morphine 100 mg ER capsule Commonly known as:  LAUREN Replaced by:  morphine CR 30 mg CR tablet Morphine 80 mg Cerp Where to Get Your Medications Information on where to get these meds will be given to you by the nurse or doctor. ! Ask your nurse or doctor about these medications  
  amLODIPine 10 mg tablet  
 carvedilol 12.5 mg tablet  
 furosemide 40 mg tablet  
 morphine CR 30 mg CR tablet  
 senna-docusate 8.6-50 mg per tablet

## 2017-05-21 NOTE — IP AVS SNAPSHOT
[address] 
[phone] Patient: Deborah Harrison MRN: [id number] You are allergic to the following Allergen Reactions Betadine (Povidone-Iodine) Hives Iodine Rash Recent Documentation Height Weight BMI Smoking Status 1.702 m 90.2 kg 31.15 kg/m2 Current Every Day Smoker Unresulted Labs Order Current Status CRYOGLOBULIN, QL In process CULTURE, BLOOD, PAIRED Preliminary result Emergency Contacts Name Discharge Info Relation Home Work Mobile Princess Melendrez  Spouse [3] [phone] About your hospitalization You were admitted on:  May 21, 2017 You last received care in the:  [facility] 4 SURG/BARIATRICS You were discharged on:  May 26, 2017 Unit phone number:  [phone] Why you were hospitalized Your primary diagnosis was:  Acute Respiratory Failure (Hcc) Your diagnoses also included:  Drug Overdose, Elevated Troponin, Acute Renal Failure (Arf) (Hcc) Providers Seen During Your Hospitalizations Provider Role Specialty Primary office phone Jorge Philip MD Attending Provider Emergency Medicine [phone] Avinash Eastman MD Attending Provider [facility] [phone] Linda Moser MD Attending Provider Internal Medicine [phone] Giovanna Riggs MD Attending Provider Internal Medicine [phone] Your Primary Care Physician (PCP) Primary Care Physician Office Phone Office Fax Adam Simms [phone] Follow-up Information Follow up With Details Comments Contact Info Mai Taylor Schedule an appointment as soon as possible for a visit on 6/2/2017 Hospital follow up PCP appointment on Friday, 6/2/17 @ 1:40 p.m. Office requesting d/c summary to be faxed to [phone]. [address] Maria Isabel Garcia [address] [place] [address] 
[phone] Arsalan Yancey MD Schedule an appointment as soon as possible for a visit in 1 week for pain management [address] [facility] [address] Ioana Solis  
[phone] Current Discharge Medication List  
  
START taking these medications Dose & Instructions Dispensing Information Comments Morning Noon Evening Bedtime  
 amLODIPine 10 mg tablet Commonly known as:  Parth Dinero Your last dose was: Your next dose is:    
   
   
 Dose:  5 mg Take 0.5 Tabs by mouth daily. Quantity:  30 Tab Refills:  0  
     
   
   
   
  
 morphine CR 30 mg CR tablet Commonly known as:  MS CONTIN Replaces:  Morphine 100 mg ER capsule Your last dose was: Your next dose is:    
   
   
 Dose:  30 mg Take 1 Tab by mouth every eight (8) hours for 7 days. Max Daily Amount: 90 mg. Quantity:  21 Tab Refills:  0  
     
   
   
   
  
 senna-docusate 8.6-50 mg per tablet Commonly known as:  Jaswinder Escobar Your last dose was: Your next dose is:    
   
   
 Dose:  1 Tab Take 1 Tab by mouth daily. Quantity:  30 Tab Refills:  0 CONTINUE these medications which have CHANGED Dose & Instructions Dispensing Information Comments Morning Noon Evening Bedtime  
 carvedilol 12.5 mg tablet Commonly known as:  Sherry Cortez What changed:  how much to take Your last dose was: Your next dose is:    
   
   
 Dose:  25 mg Take 2 Tabs by mouth two (2) times daily (with meals). Quantity:  60 Tab Refills:  0  
     
   
   
   
  
 furosemide 40 mg tablet Commonly known as:  LASIX What changed:  how much to take Your last dose was: Your next dose is:    
   
   
 Dose:  40 mg Take 1 Tab by mouth daily. Quantity:  60 Tab Refills:  0 CONTINUE these medications which have NOT CHANGED Dose & Instructions Dispensing Information Comments Morning Noon Evening Bedtime  
 aspirin 81 mg chewable tablet Your last dose was: Your next dose is:    
   
   
 Dose:  81 mg Take 1 Tab by mouth daily. Quantity:  100 Tab Refills:  0  
     
   
   
   
  
 atorvastatin 40 mg tablet Commonly known as:  LIPITOR Your last dose was: Your next dose is:    
   
   
 Dose:  40 mg Take 1 Tab by mouth every evening. Quantity:  30 Tab Refills:  0 DULoxetine 60 mg capsule Commonly known as:  CYMBALTA Your last dose was: Your next dose is:    
   
   
 Dose:  60 mg Take 60 mg by mouth daily. Refills:  0  
     
   
   
   
  
 lansoprazole 30 mg capsule Commonly known as:  PREVACID Your last dose was: Your next dose is:    
   
   
 Dose:  30 mg Take 30 mg by mouth Daily (before breakfast). Refills:  0  
     
   
   
   
  
 polyethylene glycol 17 gram packet Commonly known as:  Miguel Aus Your last dose was: Your next dose is:    
   
   
 Dose:  17 g Take 1 Packet by mouth daily. Quantity:  100 Packet Refills:  0  
     
   
   
   
  
 ramipril 10 mg capsule Commonly known as:  ALTACE Your last dose was: Your next dose is:    
   
   
 Dose:  10 mg Take 10 mg by mouth daily. Refills:  0 STOP taking these medications   
 gabapentin 800 mg tablet Commonly known as:  NEURONTIN Morphine 100 mg ER capsule Commonly known as:  LAUREN Replaced by:  morphine CR 30 mg CR tablet Morphine 80 mg Cerp Where to Get Your Medications Information on where to get these meds will be given to you by the nurse or doctor. ! Ask your nurse or doctor about these medications  
  amLODIPine 10 mg tablet  
 carvedilol 12.5 mg tablet  
 furosemide 40 mg tablet  
 morphine CR 30 mg CR tablet  
 senna-docusate 8.6-50 mg per tablet Discharge Instructions Discharge Instructions PATIENT ID: Eugenio Isaacs MRN: [id number] YOB: 1955 DATE OF ADMISSION: 5/21/2017  7:14 PM   
DATE OF DISCHARGE: 5/26/2017 PRIMARY CARE PROVIDER: Madison Lr ATTENDING PHYSICIAN: Eloisa Bravo MD 
DISCHARGING PROVIDER: Eloisa Bravo MD   
To contact this individual call [phone] and ask the  to page. If unavailable ask to be transferred the Adult Hospitalist Department. DISCHARGE DIAGNOSES Acute hypercapneic / hypoxic respiratory failure Chronic combined systolic/diastolic heart failure Elevated troponin NESS 
 
CONSULTATIONS: IP CONSULT TO NEPHROLOGY 
IP CONSULT TO INTENSIVIST 
IP CONSULT TO PALLIATIVE CARE - PROVIDER 
IP CONSULT TO CARDIOLOGY PROCEDURES/SURGERIES: * No surgery found * PENDING TEST RESULTS:  
At the time of discharge the following test results are still pending:  
 None. FOLLOW UP APPOINTMENTS:  
Follow-up Information Follow up With Details Comments Contact Info Madison Lr Schedule an appointment as soon as possible for a visit in 1 week  [address] [address] 
[phone] Jean Marie Quezada MD Schedule an appointment as soon as possible for a visit in 1 week for pain management [address] [facility] [address] [facility] 
[phone] ADDITIONAL CARE RECOMMENDATIONS:  
  You were started on the following medications: 
 (1) Amlodipine - which is a blood pressure medication 
 (2) MSContin - which is a narcotic pain medication. You were prescribed 30 mg three times daily. You should stop your previous pain medication regimen and only take this as prescribed. You should follow with Dr. Lindsay Simmons next week and your pain specialists may adjust your medications as needed. This can cause constipation, lethargy, urinary retention among other side effects. (3) Pericolace - which is a stool softener. DIET: Cardiac Diet ACTIVITY: Activity as tolerated WOUND CARE: N/A 
 
EQUIPMENT needed: N/A 
 
 
  
 SNF/Inpatient Rehab/LTAC Independent/assisted living Hospice Other: CDMP Checked:  
Yes x PROBLEM LIST Updated: 
Yes x Signed:  
Angie Larry MD 
5/26/2017 
10:28 AM 
 
Discharge Orders None [employer] Announcement We are excited to announce that we are making your provider's discharge notes available to you in [employer]. You will see these notes when they are completed and signed by the physician that discharged you from your recent hospital stay. If you have any questions or concerns about any information you see in [employer], please call the Health Information Department where you were seen or reach out to your Primary Care Provider for more information about your plan of care. Introducing [facility]! [facility] introduces [employer] patient portal. Now you can access parts of your medical record, email your doctor's office, and request medication refills online. 1. In your internet browser, go to [email or web address] 2. Click on the First Time User? Click Here link in the Sign In box. You will see the New Member Sign Up page. 3. Enter your [employer] Access Code exactly as it appears below. You will not need to use this code after youve completed the sign-up process. If you do not sign up before the expiration date, you must request a new code. · [employer] Access Code: [email or web address] Expires: 8/6/2017  6:54 PM 
 
4. Enter the last four digits of your Social Security Number (xxxx) and Date of Birth (mm/dd/yyyy) as indicated and click Submit. You will be taken to the next sign-up page. 5. Create a [employer] ID. This will be your [employer] login ID and cannot be changed, so think of one that is secure and easy to remember. 6. Create a [employer] password. You can change your password at any time. 7. Enter your Password Reset Question and Answer. This can be used at a later time if you forget your password. 8. Enter your e-mail address. You will receive e-mail notification when new information is available in [address]. 9. Click Sign Up. You can now view and download portions of your medical record. 10. Click the Download Summary menu link to download a portable copy of your medical information. If you have questions, please visit the Frequently Asked Questions section of the [employer] website. Remember, [employer] is NOT to be used for urgent needs. For medical emergencies, dial 911. Now available from your iPhone and Android! General Information Please provide this summary of care documentation to your next provider. Patient Signature:  ____________________________________________________________ Date:  ____________________________________________________________  
  
Britton Ephraimugh Provider Signature:  ____________________________________________________________ Date:  ____________________________________________________________

## 2017-05-21 NOTE — ED TRIAGE NOTES
Pt is a transfer from [facility] for respiratory distress for an opiate overdose. Placed on [address] by EMS. Pt was sent to [facility] last week for the same symptoms and ended up being intubated.

## 2017-05-22 ENCOUNTER — APPOINTMENT (OUTPATIENT)
Dept: ULTRASOUND IMAGING | Age: 62
DRG: 917 | End: 2017-05-22
Attending: INTERNAL MEDICINE
Payer: MEDICARE

## 2017-05-22 PROBLEM — I50.32 CHRONIC DIASTOLIC HEART FAILURE (HCC): Chronic | Status: ACTIVE | Noted: 2017-05-22

## 2017-05-22 LAB
AMMONIA PLAS-SCNC: 36 UMOL/L
ANION GAP BLD CALC-SCNC: 11 MMOL/L (ref 5–15)
APAP SERPL-MCNC: 3 UG/ML (ref 10–30)
APPEARANCE UR: CLEAR
ARTERIAL PATENCY WRIST A: NO
ARTERIAL PATENCY WRIST A: YES
ATRIAL RATE: 76 BPM
BACTERIA URNS QL MICRO: NEGATIVE /HPF
BASE DEFICIT BLD-SCNC: 4 MMOL/L
BASE EXCESS BLD CALC-SCNC: 0 MMOL/L
BASOPHILS # BLD AUTO: 0 K/UL (ref 0–0.1)
BASOPHILS # BLD: 0 % (ref 0–1)
BDY SITE: ABNORMAL
BDY SITE: ABNORMAL
BILIRUB UR QL: NEGATIVE
BNP SERPL-MCNC: 327 PG/ML (ref 0–100)
BUN SERPL-MCNC: 30 MG/DL (ref 6–20)
BUN/CREAT SERPL: 15 (ref 12–20)
CALCIUM SERPL-MCNC: 8.2 MG/DL (ref 8.5–10.1)
CALCULATED P AXIS, ECG09: 61 DEGREES
CALCULATED R AXIS, ECG10: 28 DEGREES
CALCULATED T AXIS, ECG11: 160 DEGREES
CHLORIDE SERPL-SCNC: 105 MMOL/L (ref 97–108)
CHOLEST SERPL-MCNC: 105 MG/DL
CO2 SERPL-SCNC: 24 MMOL/L (ref 21–32)
COLOR UR: ABNORMAL
CREAT SERPL-MCNC: 2.03 MG/DL (ref 0.7–1.3)
DIAGNOSIS, 93000: NORMAL
EOSINOPHIL # BLD: 0.2 K/UL (ref 0–0.4)
EOSINOPHIL NFR BLD: 1 % (ref 0–7)
EPITH CASTS URNS QL MICRO: ABNORMAL /LPF
ERYTHROCYTE [DISTWIDTH] IN BLOOD BY AUTOMATED COUNT: 15.3 % (ref 11.5–14.5)
GAS FLOW.O2 O2 DELIVERY SYS: ABNORMAL L/MIN
GAS FLOW.O2 O2 DELIVERY SYS: ABNORMAL L/MIN
GLUCOSE SERPL-MCNC: 108 MG/DL (ref 65–100)
GLUCOSE UR STRIP.AUTO-MCNC: NEGATIVE MG/DL
HBV SURFACE AB SER QL: NONREACTIVE
HBV SURFACE AB SER-ACNC: <3.1 MIU/ML
HCO3 BLD-SCNC: 23.6 MMOL/L (ref 22–26)
HCO3 BLD-SCNC: 27.1 MMOL/L (ref 22–26)
HCT VFR BLD AUTO: 36.5 % (ref 36.6–50.3)
HCV AB SERPL QL IA: NONREACTIVE
HCV COMMENT,HCGAC: NORMAL
HDLC SERPL-MCNC: 43 MG/DL
HDLC SERPL: 2.4 {RATIO} (ref 0–5)
HGB BLD-MCNC: 11.5 G/DL (ref 12.1–17)
HGB UR QL STRIP: ABNORMAL
KETONES UR QL STRIP.AUTO: ABNORMAL MG/DL
LACTATE SERPL-SCNC: 0.6 MMOL/L (ref 0.4–2)
LDLC SERPL CALC-MCNC: 49.2 MG/DL (ref 0–100)
LEUKOCYTE ESTERASE UR QL STRIP.AUTO: NEGATIVE
LIPID PROFILE,FLP: NORMAL
LYMPHOCYTES # BLD AUTO: 8 % (ref 12–49)
LYMPHOCYTES # BLD: 1.2 K/UL (ref 0.8–3.5)
MCH RBC QN AUTO: 29.7 PG (ref 26–34)
MCHC RBC AUTO-ENTMCNC: 31.5 G/DL (ref 30–36.5)
MCV RBC AUTO: 94.3 FL (ref 80–99)
MONOCYTES # BLD: 1.2 K/UL (ref 0–1)
MONOCYTES NFR BLD AUTO: 8 % (ref 5–13)
NEUTS SEG # BLD: 13.2 K/UL (ref 1.8–8)
NEUTS SEG NFR BLD AUTO: 83 % (ref 32–75)
NITRITE UR QL STRIP.AUTO: NEGATIVE
O2/TOTAL GAS SETTING VFR VENT: 40 %
O2/TOTAL GAS SETTING VFR VENT: 40 %
P-R INTERVAL, ECG05: 110 MS
PCO2 BLD: 54.9 MMHG (ref 35–45)
PCO2 BLD: 61.3 MMHG (ref 35–45)
PEEP RESPIRATORY: 5 CMH2O
PEEP RESPIRATORY: 5 CMH2O
PH BLD: 7.24 [PH] (ref 7.35–7.45)
PH BLD: 7.25 [PH] (ref 7.35–7.45)
PH UR STRIP: 5 [PH] (ref 5–8)
PIP ISTAT,IPIP: 15
PLATELET # BLD AUTO: 325 K/UL (ref 150–400)
PO2 BLD: 49 MMHG (ref 80–100)
PO2 BLD: 94 MMHG (ref 80–100)
POTASSIUM SERPL-SCNC: 4.1 MMOL/L (ref 3.5–5.1)
PRESSURE SUPPORT SETTING VENT: 10 CMH2O
PROT UR STRIP-MCNC: NEGATIVE MG/DL
Q-T INTERVAL, ECG07: 438 MS
QRS DURATION, ECG06: 74 MS
QTC CALCULATION (BEZET), ECG08: 492 MS
RBC # BLD AUTO: 3.87 M/UL (ref 4.1–5.7)
RBC #/AREA URNS HPF: ABNORMAL /HPF (ref 0–5)
SAO2 % BLD: 76 % (ref 92–97)
SAO2 % BLD: 96 % (ref 92–97)
SODIUM SERPL-SCNC: 140 MMOL/L (ref 136–145)
SP GR UR REFRACTOMETRY: 1.02 (ref 1–1.03)
SPECIMEN TYPE: ABNORMAL
SPECIMEN TYPE: ABNORMAL
TOTAL RESP. RATE, ITRR: 20
TRIGL SERPL-MCNC: 64 MG/DL (ref ?–150)
TROPONIN I SERPL-MCNC: 0.19 NG/ML
UA: UC IF INDICATED,UAUC: ABNORMAL
UROBILINOGEN UR QL STRIP.AUTO: 0.2 EU/DL (ref 0.2–1)
VENTILATION MODE VENT: ABNORMAL
VENTRICULAR RATE, ECG03: 76 BPM
VLDLC SERPL CALC-MCNC: 12.8 MG/DL
WBC # BLD AUTO: 15.8 K/UL (ref 4.1–11.1)
WBC URNS QL MICRO: ABNORMAL /HPF (ref 0–4)

## 2017-05-22 PROCEDURE — 84484 ASSAY OF TROPONIN QUANT: CPT | Performed by: FAMILY MEDICINE

## 2017-05-22 PROCEDURE — 82140 ASSAY OF AMMONIA: CPT | Performed by: FAMILY MEDICINE

## 2017-05-22 PROCEDURE — 36600 WITHDRAWAL OF ARTERIAL BLOOD: CPT

## 2017-05-22 PROCEDURE — 83605 ASSAY OF LACTIC ACID: CPT | Performed by: FAMILY MEDICINE

## 2017-05-22 PROCEDURE — 74011250636 HC RX REV CODE- 250/636: Performed by: INTERNAL MEDICINE

## 2017-05-22 PROCEDURE — 81001 URINALYSIS AUTO W/SCOPE: CPT | Performed by: INTERNAL MEDICINE

## 2017-05-22 PROCEDURE — 74011250637 HC RX REV CODE- 250/637: Performed by: INTERNAL MEDICINE

## 2017-05-22 PROCEDURE — 80048 BASIC METABOLIC PNL TOTAL CA: CPT | Performed by: FAMILY MEDICINE

## 2017-05-22 PROCEDURE — 76450000000

## 2017-05-22 PROCEDURE — 83880 ASSAY OF NATRIURETIC PEPTIDE: CPT | Performed by: FAMILY MEDICINE

## 2017-05-22 PROCEDURE — 94003 VENT MGMT INPAT SUBQ DAY: CPT

## 2017-05-22 PROCEDURE — 65610000006 HC RM INTENSIVE CARE

## 2017-05-22 PROCEDURE — 94002 VENT MGMT INPAT INIT DAY: CPT

## 2017-05-22 PROCEDURE — C9113 INJ PANTOPRAZOLE SODIUM, VIA: HCPCS | Performed by: INTERNAL MEDICINE

## 2017-05-22 PROCEDURE — 94640 AIRWAY INHALATION TREATMENT: CPT

## 2017-05-22 PROCEDURE — 74011250637 HC RX REV CODE- 250/637: Performed by: PHYSICAL MEDICINE & REHABILITATION

## 2017-05-22 PROCEDURE — 77030029684 HC NEB SM VOL KT MONA -A

## 2017-05-22 PROCEDURE — 74011000250 HC RX REV CODE- 250: Performed by: INTERNAL MEDICINE

## 2017-05-22 PROCEDURE — 74011250636 HC RX REV CODE- 250/636: Performed by: FAMILY MEDICINE

## 2017-05-22 PROCEDURE — 80307 DRUG TEST PRSMV CHEM ANLYZR: CPT | Performed by: INTERNAL MEDICINE

## 2017-05-22 PROCEDURE — 77030013140 HC MSK NEB VYRM -A

## 2017-05-22 PROCEDURE — 77010033678 HC OXYGEN DAILY

## 2017-05-22 PROCEDURE — 74011250636 HC RX REV CODE- 250/636: Performed by: PHYSICAL MEDICINE & REHABILITATION

## 2017-05-22 PROCEDURE — 80061 LIPID PANEL: CPT | Performed by: FAMILY MEDICINE

## 2017-05-22 PROCEDURE — 74011000258 HC RX REV CODE- 258: Performed by: INTERNAL MEDICINE

## 2017-05-22 PROCEDURE — 87040 BLOOD CULTURE FOR BACTERIA: CPT | Performed by: INTERNAL MEDICINE

## 2017-05-22 PROCEDURE — 36415 COLL VENOUS BLD VENIPUNCTURE: CPT | Performed by: FAMILY MEDICINE

## 2017-05-22 PROCEDURE — 76770 US EXAM ABDO BACK WALL COMP: CPT

## 2017-05-22 PROCEDURE — 74011000250 HC RX REV CODE- 250

## 2017-05-22 PROCEDURE — 74011000258 HC RX REV CODE- 258: Performed by: FAMILY MEDICINE

## 2017-05-22 PROCEDURE — 82803 BLOOD GASES ANY COMBINATION: CPT

## 2017-05-22 PROCEDURE — 85025 COMPLETE CBC W/AUTO DIFF WBC: CPT | Performed by: FAMILY MEDICINE

## 2017-05-22 RX ORDER — FENTANYL CITRATE 50 UG/ML
25 INJECTION, SOLUTION INTRAMUSCULAR; INTRAVENOUS
Status: DISCONTINUED | OUTPATIENT
Start: 2017-05-22 | End: 2017-05-23

## 2017-05-22 RX ORDER — IPRATROPIUM BROMIDE AND ALBUTEROL SULFATE 2.5; .5 MG/3ML; MG/3ML
3 SOLUTION RESPIRATORY (INHALATION)
Status: DISCONTINUED | OUTPATIENT
Start: 2017-05-22 | End: 2017-05-25

## 2017-05-22 RX ORDER — SODIUM BICARBONATE 1 MEQ/ML
SYRINGE (ML) INTRAVENOUS
Status: COMPLETED
Start: 2017-05-22 | End: 2017-05-22

## 2017-05-22 RX ORDER — SODIUM BICARBONATE 1 MEQ/ML
100 SYRINGE (ML) INTRAVENOUS ONCE
Status: COMPLETED | OUTPATIENT
Start: 2017-05-22 | End: 2017-05-22

## 2017-05-22 RX ORDER — MORPHINE SULFATE 15 MG/1
30 TABLET, FILM COATED, EXTENDED RELEASE ORAL EVERY 12 HOURS
Status: DISCONTINUED | OUTPATIENT
Start: 2017-05-22 | End: 2017-05-23

## 2017-05-22 RX ORDER — CARVEDILOL 12.5 MG/1
12.5 TABLET ORAL 2 TIMES DAILY WITH MEALS
Status: DISCONTINUED | OUTPATIENT
Start: 2017-05-22 | End: 2017-05-25

## 2017-05-22 RX ADMIN — HEPARIN SODIUM 5000 UNITS: 5000 INJECTION, SOLUTION INTRAVENOUS; SUBCUTANEOUS at 05:51

## 2017-05-22 RX ADMIN — ACETYLCYSTEINE 9440 MG: 200 INJECTION, SOLUTION INTRAVENOUS at 08:59

## 2017-05-22 RX ADMIN — Medication 10 ML: at 13:26

## 2017-05-22 RX ADMIN — Medication 10 ML: at 05:52

## 2017-05-22 RX ADMIN — NALOXONE HYDROCHLORIDE 0.05 MG/HR: 0.4 INJECTION, SOLUTION INTRAMUSCULAR; INTRAVENOUS; SUBCUTANEOUS at 01:30

## 2017-05-22 RX ADMIN — SODIUM CHLORIDE 75 ML/HR: 900 INJECTION, SOLUTION INTRAVENOUS at 15:49

## 2017-05-22 RX ADMIN — HEPARIN SODIUM 5000 UNITS: 5000 INJECTION, SOLUTION INTRAVENOUS; SUBCUTANEOUS at 14:55

## 2017-05-22 RX ADMIN — SODIUM BICARBONATE 100 MEQ: 84 INJECTION, SOLUTION INTRAVENOUS at 09:10

## 2017-05-22 RX ADMIN — CARVEDILOL 12.5 MG: 12.5 TABLET, FILM COATED ORAL at 16:39

## 2017-05-22 RX ADMIN — NALOXONE HYDROCHLORIDE 0.4 MG: 0.4 INJECTION, SOLUTION INTRAMUSCULAR; INTRAVENOUS; SUBCUTANEOUS at 00:11

## 2017-05-22 RX ADMIN — HEPARIN SODIUM 5000 UNITS: 5000 INJECTION, SOLUTION INTRAVENOUS; SUBCUTANEOUS at 21:40

## 2017-05-22 RX ADMIN — Medication 10 ML: at 21:41

## 2017-05-22 RX ADMIN — PIPERACILLIN SODIUM,TAZOBACTAM SODIUM 3.38 G: 3; .375 INJECTION, POWDER, FOR SOLUTION INTRAVENOUS at 12:11

## 2017-05-22 RX ADMIN — ACETYLCYSTEINE 4720 MG: 200 INJECTION, SOLUTION INTRAVENOUS at 04:55

## 2017-05-22 RX ADMIN — IPRATROPIUM BROMIDE AND ALBUTEROL SULFATE 3 ML: .5; 3 SOLUTION RESPIRATORY (INHALATION) at 16:24

## 2017-05-22 RX ADMIN — IPRATROPIUM BROMIDE AND ALBUTEROL SULFATE 3 ML: .5; 3 SOLUTION RESPIRATORY (INHALATION) at 10:00

## 2017-05-22 RX ADMIN — Medication 100 MEQ: at 09:10

## 2017-05-22 RX ADMIN — SODIUM CHLORIDE 40 MG: 9 INJECTION INTRAMUSCULAR; INTRAVENOUS; SUBCUTANEOUS at 12:11

## 2017-05-22 RX ADMIN — FENTANYL CITRATE 25 MCG: 50 INJECTION, SOLUTION INTRAMUSCULAR; INTRAVENOUS at 17:13

## 2017-05-22 RX ADMIN — IPRATROPIUM BROMIDE AND ALBUTEROL SULFATE 3 ML: .5; 3 SOLUTION RESPIRATORY (INHALATION) at 21:33

## 2017-05-22 RX ADMIN — MORPHINE SULFATE 30 MG: 15 TABLET, EXTENDED RELEASE ORAL at 19:16

## 2017-05-22 RX ADMIN — PIPERACILLIN SODIUM,TAZOBACTAM SODIUM 3.38 G: 3; .375 INJECTION, POWDER, FOR SOLUTION INTRAVENOUS at 21:41

## 2017-05-22 RX ADMIN — PIPERACILLIN SODIUM,TAZOBACTAM SODIUM 3.38 G: 3; .375 INJECTION, POWDER, FOR SOLUTION INTRAVENOUS at 16:23

## 2017-05-22 RX ADMIN — ACETYLCYSTEINE 14160 MG: 200 INJECTION, SOLUTION INTRAVENOUS at 03:30

## 2017-05-22 NOTE — PROGRESS NOTES
2230: Pt arrived to ICU lethargic, opens eyes to stimulus, does not answer when asked questions, pupils +2, brisk, round. On BiPAP 12/6 40% FiO2, rigid with intermittent twitching. VSS. No MIVF. U/O adequate. 2320: ABG complete. Intensivist paged. 2335: Spoke with Dr. Bradley Hale. Aware of pt's status. Orders for noninvasive ventilator support with settings A/C 24, , Peep 5 an to repeat ABG in 1 hour. 2340: Spoke with Dr. Mckay Culver of pt's status. Orders for dose of narcan once now and narcan drip. 0010: Narcan dose administered per order. 0015: Pt alert. States he takes 60 mg Morphine in the morning and 80 mg Morphine at night. States \"he cannot overdose\". Confused- disoriented to place. 0120: ABG complete. Dr. Bradley Hale paged. 0125: Spoke with Dr. Brdaley Hale. Aware of ABG. Orders to keep current non-invasive ventilator mask settings and repeat ABG at 0500.   0130: Narcan drip started at 0.05 mg/hr per order. 80: Spoke with Neeraj from poison control. Faxed information regarding Narcan drip concentration and rate. Recommended administering mucomyst for elevated AST and acetaminophen level. 80: Spoke with Dr. Boris Hammond. Aware of poison control's recommendations. Orders to increase Narcan drip rate to 0.1 mg/hr. Orders for acetylcysteine. Shift summary: Pt received 1 dose of 0.4 mg Narcan in ICU and received 1L of LR with 0.4 mg Narcan in drip. Remains on bipap (non-invasive mask on vent), 40%. Hypercapnia present on ABGs. Awake for 15 minutes from Narcan push, then somnolent after- opens eyes to stimulus. Able to answer questions, but confused with AMS. Intermittent twitching. PERRL.

## 2017-05-22 NOTE — ROUTINE PROCESS
2155: TRANSFER - IN REPORT:    Verbal report received from [facility], [facility] (name) on Diana Gutierrez  being received from ED (unit) for urgent transfer      Report consisted of patients Situation, Background, Assessment and   Recommendations(SBAR). Information from the following report(s) SBAR, Kardex, ED Summary, Procedure Summary, Intake/Output, MAR, Accordion, Recent Results, Med Rec Status, Cardiac Rhythm NSR and Alarm Parameters  was reviewed with the receiving nurse. Opportunity for questions and clarification was provided. Assessment completed upon patients arrival to unit and care assumed. 0730: Bedside and Verbal shift change report given to Danielito Sandoval RN (oncoming nurse) by Maggi Armstrong RN (offgoing nurse). Report included the following information SBAR, Kardex, ED Summary, Procedure Summary, Intake/Output, MAR, Accordion, Recent Results, Med Rec Status, Cardiac Rhythm NSR and Alarm Parameters .
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
## 2017-05-22 NOTE — ED NOTES
Spoke with Elisabeth with poison center; recommended supportive care with an addition to tylenol and aspirin levels in blood work;  Elisabeth suggested that if patient needs an additional dose of Narcan, patient needs to be on Narcan drip

## 2017-05-22 NOTE — CONSULTS
[facility]    DISCUSSION & [address] Day: 2    History  &  ROS:  Unable to obtain due to patient factors : Disease State       · Patient admitted with hypercapnic and hypoxic respiratory failure with opiate and benzodiazepines onboard suppressing his respirations. He also has acute renal insufficiency. Currently, he is somnolent but arousable. Not synchronizing with the noninvasive ventilator due to higher rate. Decreasing rate and giving some nebulizers. He's completed his Narcan. He is arousable at this point. Due to chronic narcotic use we'll discontinue the Narcan drip. Tylenol level was 4. Recheck level. If it is not elevated, medications may be discontinued. Continue ICU care. Ortega catheter in place with good urine output. Creatinine coming down. Sequential compression devices are in place. Continue routine ICU care.        Active Hospital Problems    Diagnosis Date Noted    Acute respiratory failure ([facility] [address] 2017    Drug overdose 2017    Elevated troponin 2017    Acute renal failure (ARF) (H[facility]) 2017          PHYSICAL        Intake/Output Summary (Last 24 hours) at 17 0842  Last data filed at 17 0800   Gross per 24 hour   Intake          2979.39 ml   Output             1625 ml   Net          1354.39 ml       Temp (24hrs), Av.1 °F (37.3 °C), Min:96.8 °F (36 °C), Max:100.8 °F (38.2 °C)       Visit Vitals    /65 (BP 1 Location: Left arm, BP Patient Position: At rest)    Pulse 91    Temp (!) 100.8 °F (38.2 °C)    Resp 24    Ht 5' 7\" (1.702 m)    Wt 94 kg (207 lb 3.7 oz)    SpO2 98%    BMI 32.46 kg/m2              General:    Nontoxic   No Distress      Eyes:  Anicteric    Noninjected     ENT:  Moist   No Thrush     Neck:  No Mass   Midline Trachea      CV:  Regular    No Murmur     LUNG:  Clear    Equal BS     GI:  NABS  Nontender     :  Clear Urine  Norm Genitalia      SKEL:  WD WN  No Clubbing     SKIN: Perfused    No Drug Rash     NEURO:  A & O x 3  Non Focal     PSYCH:  Nonagitated  Good Insight        DATA    [x]  Reviewed: O2 / PAP / Ventilator  O2 Device: BIPAP (05/22/17 0802)    Ideal body weight: 66.1 kg (145 lb 11.6 oz)  Adjusted ideal body weight: 77.3 kg (170 lb 5.2 oz)                       Mode:  NIPPV (bipap AVAPS)                       Rate:           Tidal Volume: 650 ml                Pressure:  10 cm H2O                      FiO2:  40 %                    PEEP:  5 cm H20                        PIP:  17 cm H2O  Minute Ventilation:  14.9 l/min    Recent Labs      05/22/17   0544  05/22/17   0105  05/21/17   2316   PHI  7.253*  7.242*  7.166*   PCO2I  61.3*  54.9*  66.8*   PO2I  49*  94  96       Recent Labs      05/22/17   0122  05/21/17   1931   WBC  15.8*   --    HGB  11.5*   --    PLT  325   --    INR   --   1.0   APTT   --   25.4       Recent Labs      05/22/17   0335  05/22/17   0122  05/21/17   1927   NA   --   140  138   K   --   4.1  4.5   CL   --   105  105   CO2   --   24  25   GLU   --   108*  106*   BUN   --   30*  35*   CREA   --   2.03*  3.94*   CA   --   8.2*  7.9*   LAC  0.6   --    --    ALB   --    --   2.9*   SGOT   --    --   43*   ALT   --    --   38      IMAGING     Results from Hospital Encounter encounter on 05/21/17   XR CHEST PORT   Narrative Clinical history: Opioid overdose  INDICATION: Opiate overdose    COMPARISON: 5/16/2017    FINDINGS:   AP semiupright view of the chest is obtained . The cardiopericardial silhouette  is stable. There is no pleural effusion, pneumothorax or focal consolidation  present. Impression IMPRESSION:    No acute thoracic disease. Results from [place] encounter on 05/08/17   XR CHEST PA LAT   Narrative EXAM:  XR CHEST PA LAT    INDICATION:  Pneumonia. Follow-up abnormal chest radiograph. COMPARISON: 5/13/2017    TECHNIQUE: PA and lateral chest views    FINDINGS: Cardiac monitoring wires overlie the thorax.  The cardiomediastinal  contours are stable. The pulmonary vasculature is within normal limits. Left lower lobe airspace opacity is nearly completely resolved. The right lung  and pleural spaces are clear. There is no pneumothorax. The bones and upper  abdomen are stable. Impression IMPRESSION:    Newly completely resolved left lower lobe airspace opacity. Clear right lung. XR CHEST PA LAT   Narrative INDICATION:   SOB, s/p extubation    COMPARISON: May 12, 2017    FINDINGS:    Frontal and lateral views of the chest demonstrate a normal cardiomediastinal  silhouette. The lungs are adequately expanded. Improved right basilar aeration. Worsening left basilar airspace disease. No pulmonary edema or pneumothorax. The  osseous structures are unremarkable. Impression IMPRESSION:  Worsening left basilar airspace disease. Slight improvement in right basilar  airspace disease. Results from Hospital Encounter encounter on 05/08/17   CT HEAD WO CONT   Narrative EXAM:  CT HEAD WITHOUT CONTRAST  INDICATION: Confusion and delirium with unexplained altered level of  consciousness. COMPARISON: None. CONTRAST: None. TECHNIQUE: Unenhanced CT of the head was performed using 5 mm images. Brain and  bone windows were generated. Sagittal and coronal reformations were generated. CT dose reduction was achieved through use of a standardized protocol tailored  for this examination and automatic exposure control for dose modulation. CT dose  reduction was achieved through use of a standardized protocol tailored for this  examination and automatic exposure control for dose modulation. Adaptive  statistical iterative reconstruction (ASIR) was utilized for this examination. FINDINGS:  The ventricles and sulci are normal in size, shape and configuration and  midline.   There is atherosclerotic calcification and minimal patchy decreased  attenuation in the periventricular white matter and basal ganglia consistent  with small vessel disease. There is an old low-attenuation left occipital  infarct. There is no intracranial hemorrhage. There is no extra-axial  collection, mass, mass effect or midline shift. The basilar cisterns are open. No acute infarct is identified. The bone windows demonstrate no abnormalities. The visualized portions of the paranasal sinuses and mastoid air cells are  clear. Impression IMPRESSION: Atherosclerosis with microvascular disease and old left occipital  infarct. MEDICAL HISTORY    PMH:  has a past medical history of Hypertension; Ill-defined condition; and Neurological disorder. PSH:  has a past surgical history that includes orthopaedic and cardiac surg procedure unlist.    FHX: family history is not on file. SHX:  reports that he has been smoking. He does not have any smokeless tobacco history on file. He reports that he does not drink alcohol or use illicit drugs.    ALLERGY   Allergies   Allergen Reactions    Betadine [Povidone-Iodine] Hives    Iodine Rash      MEDICINES   Current Facility-Administered Medications   Medication    acetylcysteine (ACETADOTE) 4,720 mg in dextrose 5% 500 mL infusion    Followed by   [facility] acetylcysteine (ACETADOTE) 9,440 mg in dextrose 5% 1,000 mL infusion    sodium chloride (NS) flush 5-10 mL    sodium chloride (NS) flush 5-10 mL    sodium chloride (NS) flush 5-10 mL    sodium chloride (NS) flush 5-10 mL    heparin (porcine) injection 5,000 Units    0.9% sodium chloride infusion    naloxone (NARCAN) 0.4 mg in lactated ringers 1,000 mL infusion       Zayda Hinkle MD [facility]
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
## 2017-05-22 NOTE — PROGRESS NOTES
0745-Bedside, Verbal and Written shift change report given to DAYDAY GomezRN (oncoming nurse) by Radha Abdullahi (offgoing nurse). Report included the following information SBAR, Kardex, ED Summary, Intake/Output, MAR, Accordion, Recent Results, Cardiac Rhythm NSR w/ T-Wave inversions  and Alarm Parameters . 0800-RT at bedside, bipap setting adjusted as noted in flowsheet by RT  0820-dr khalil w/ nephrology at bedside seeing pt for consult. 1500-pt awake/alert, pt placed on NC, tolerating well. 1515-dr niño notified poison control representative recommends to recheck liver enzymes 2 hours prior to turning off acetylcysteine drip, no new lab orders received. 1550-dr landon at bedside evaluating patient. 1915-pt becoming more restless, fidgeting,and pulling out IV lines, continues to need frequent redirection. 1930-Bedside and Verbal shift change report given to Radha Abdullahi (oncoming nurse) by Shiraz Macias (offgoing nurse). Report included the following information SBAR, Kardex, Intake/Output, MAR, Accordion, Recent Results, Cardiac Rhythm NSR w/ inverted T waves and Alarm Parameters .

## 2017-05-22 NOTE — PROGRESS NOTES
Nurse called and notes poison control recommending adjusting Narcan IV drip and starting Acetylcysteine with slightly elevated acetaminophen level. Per recommendations, I increased Narcan IV infusion rate and will order Acetylcysteine.

## 2017-05-22 NOTE — ROUTINE PROCESS
2230: Primary Nurse Jer Gustafson, RN and Hollie Nassar, RN performed a dual skin assessment on this patient No impairment noted  Kavin score is 12
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
## 2017-05-22 NOTE — PROGRESS NOTES
Nurse called. ABG results worsened. Pulmonologist called and made changes on BIPAP settings. Repeat ABG after these changes. Pt still somnolent. Ordered stat Narcan O.4 mg IV dose, followed by Narcan IV infusion per recommendations from poison control center. If patient not improved, then proceed towards endotracheal intubation and place on mechanical ventilator.

## 2017-05-22 NOTE — H&P
H&P dictated. Job# [id number]. I spent over 70 minutes of continued critical care and management for patient.

## 2017-05-23 ENCOUNTER — APPOINTMENT (OUTPATIENT)
Dept: GENERAL RADIOLOGY | Age: 62
DRG: 917 | End: 2017-05-23
Attending: INTERNAL MEDICINE
Payer: MEDICARE

## 2017-05-23 LAB
ALBUMIN SERPL BCP-MCNC: 2.6 G/DL (ref 3.5–5)
ALBUMIN/GLOB SERPL: 0.7 {RATIO} (ref 1.1–2.2)
ALP SERPL-CCNC: 74 U/L (ref 45–117)
ALT SERPL-CCNC: 26 U/L (ref 12–78)
ANA SER QL: NEGATIVE
ANA TITR SER IF: NEGATIVE {TITER}
ANION GAP BLD CALC-SCNC: 7 MMOL/L (ref 5–15)
ANION GAP BLD CALC-SCNC: 9 MMOL/L (ref 5–15)
AST SERPL W P-5'-P-CCNC: 23 U/L (ref 15–37)
BASOPHILS # BLD AUTO: 0 K/UL (ref 0–0.1)
BASOPHILS # BLD: 0 % (ref 0–1)
BILIRUB SERPL-MCNC: 0.7 MG/DL (ref 0.2–1)
BUN SERPL-MCNC: 10 MG/DL (ref 6–20)
BUN SERPL-MCNC: 15 MG/DL (ref 6–20)
BUN/CREAT SERPL: 14 (ref 12–20)
BUN/CREAT SERPL: 18 (ref 12–20)
C-ANCA TITR SER IF: NORMAL TITER
C3 SERPL-MCNC: 118 MG/DL (ref 82–167)
C4 SERPL-MCNC: 25 MG/DL (ref 14–44)
CALCIUM SERPL-MCNC: 8.7 MG/DL (ref 8.5–10.1)
CALCIUM SERPL-MCNC: 8.7 MG/DL (ref 8.5–10.1)
CHLORIDE SERPL-SCNC: 103 MMOL/L (ref 97–108)
CHLORIDE SERPL-SCNC: 103 MMOL/L (ref 97–108)
CO2 SERPL-SCNC: 26 MMOL/L (ref 21–32)
CO2 SERPL-SCNC: 29 MMOL/L (ref 21–32)
CREAT SERPL-MCNC: 0.7 MG/DL (ref 0.7–1.3)
CREAT SERPL-MCNC: 0.84 MG/DL (ref 0.7–1.3)
DIFFERENTIAL METHOD BLD: ABNORMAL
DSDNA AB SER-ACNC: <1 IU/ML (ref 0–9)
EOSINOPHIL # BLD: 0.3 K/UL (ref 0–0.4)
EOSINOPHIL NFR BLD: 2 % (ref 0–7)
ERYTHROCYTE [DISTWIDTH] IN BLOOD BY AUTOMATED COUNT: 14.4 % (ref 11.5–14.5)
GLOBULIN SER CALC-MCNC: 3.8 G/DL (ref 2–4)
GLUCOSE SERPL-MCNC: 112 MG/DL (ref 65–100)
GLUCOSE SERPL-MCNC: 112 MG/DL (ref 65–100)
HCT VFR BLD AUTO: 32.6 % (ref 36.6–50.3)
HGB BLD-MCNC: 10.9 G/DL (ref 12.1–17)
LYMPHOCYTES # BLD AUTO: 11 % (ref 12–49)
LYMPHOCYTES # BLD: 1.4 K/UL (ref 0.8–3.5)
MAGNESIUM SERPL-MCNC: 1.5 MG/DL (ref 1.6–2.4)
MAGNESIUM SERPL-MCNC: 1.8 MG/DL (ref 1.6–2.4)
MCH RBC QN AUTO: 30 PG (ref 26–34)
MCHC RBC AUTO-ENTMCNC: 33.4 G/DL (ref 30–36.5)
MCV RBC AUTO: 89.8 FL (ref 80–99)
MONOCYTES # BLD: 1.4 K/UL (ref 0–1)
MONOCYTES NFR BLD AUTO: 11 % (ref 5–13)
NEUTS SEG # BLD: 9.7 K/UL (ref 1.8–8)
NEUTS SEG NFR BLD AUTO: 76 % (ref 32–75)
P-ANCA ATYPICAL TITR SER IF: NORMAL TITER
P-ANCA TITR SER IF: NORMAL TITER
PHOSPHATE SERPL-MCNC: 0.9 MG/DL (ref 2.6–4.7)
PHOSPHATE SERPL-MCNC: 1.4 MG/DL (ref 2.6–4.7)
PLATELET # BLD AUTO: 268 K/UL (ref 150–400)
POTASSIUM SERPL-SCNC: 3.1 MMOL/L (ref 3.5–5.1)
POTASSIUM SERPL-SCNC: 3.5 MMOL/L (ref 3.5–5.1)
PROT SERPL-MCNC: 6.4 G/DL (ref 6.4–8.2)
RBC # BLD AUTO: 3.63 M/UL (ref 4.1–5.7)
RBC MORPH BLD: ABNORMAL
SODIUM SERPL-SCNC: 138 MMOL/L (ref 136–145)
SODIUM SERPL-SCNC: 139 MMOL/L (ref 136–145)
STREP DNASE B SER-ACNC: 364 U/ML (ref 0–120)
WBC # BLD AUTO: 12.8 K/UL (ref 4.1–11.1)

## 2017-05-23 PROCEDURE — 74011250637 HC RX REV CODE- 250/637: Performed by: INTERNAL MEDICINE

## 2017-05-23 PROCEDURE — 83735 ASSAY OF MAGNESIUM: CPT | Performed by: INTERNAL MEDICINE

## 2017-05-23 PROCEDURE — 74011250636 HC RX REV CODE- 250/636: Performed by: FAMILY MEDICINE

## 2017-05-23 PROCEDURE — 92610 EVALUATE SWALLOWING FUNCTION: CPT | Performed by: SPEECH-LANGUAGE PATHOLOGIST

## 2017-05-23 PROCEDURE — 74011250636 HC RX REV CODE- 250/636: Performed by: PHYSICIAN ASSISTANT

## 2017-05-23 PROCEDURE — 74011250636 HC RX REV CODE- 250/636: Performed by: PHYSICAL MEDICINE & REHABILITATION

## 2017-05-23 PROCEDURE — 80048 BASIC METABOLIC PNL TOTAL CA: CPT | Performed by: INTERNAL MEDICINE

## 2017-05-23 PROCEDURE — 74011000250 HC RX REV CODE- 250: Performed by: INTERNAL MEDICINE

## 2017-05-23 PROCEDURE — 74011250637 HC RX REV CODE- 250/637: Performed by: EMERGENCY MEDICINE

## 2017-05-23 PROCEDURE — 74011250636 HC RX REV CODE- 250/636: Performed by: INTERNAL MEDICINE

## 2017-05-23 PROCEDURE — 80053 COMPREHEN METABOLIC PANEL: CPT | Performed by: INTERNAL MEDICINE

## 2017-05-23 PROCEDURE — 84100 ASSAY OF PHOSPHORUS: CPT | Performed by: INTERNAL MEDICINE

## 2017-05-23 PROCEDURE — 36415 COLL VENOUS BLD VENIPUNCTURE: CPT | Performed by: INTERNAL MEDICINE

## 2017-05-23 PROCEDURE — C9113 INJ PANTOPRAZOLE SODIUM, VIA: HCPCS | Performed by: INTERNAL MEDICINE

## 2017-05-23 PROCEDURE — 74011250637 HC RX REV CODE- 250/637: Performed by: PHYSICAL MEDICINE & REHABILITATION

## 2017-05-23 PROCEDURE — 71010 XR CHEST PORT: CPT

## 2017-05-23 PROCEDURE — 65610000006 HC RM INTENSIVE CARE

## 2017-05-23 PROCEDURE — 85025 COMPLETE CBC W/AUTO DIFF WBC: CPT | Performed by: INTERNAL MEDICINE

## 2017-05-23 PROCEDURE — 74011000258 HC RX REV CODE- 258: Performed by: INTERNAL MEDICINE

## 2017-05-23 PROCEDURE — 94640 AIRWAY INHALATION TREATMENT: CPT

## 2017-05-23 RX ORDER — FENTANYL CITRATE 50 UG/ML
25-50 INJECTION, SOLUTION INTRAMUSCULAR; INTRAVENOUS
Status: DISCONTINUED | OUTPATIENT
Start: 2017-05-23 | End: 2017-05-26 | Stop reason: HOSPADM

## 2017-05-23 RX ORDER — ONDANSETRON 2 MG/ML
4 INJECTION INTRAMUSCULAR; INTRAVENOUS
Status: DISCONTINUED | OUTPATIENT
Start: 2017-05-23 | End: 2017-05-26 | Stop reason: HOSPADM

## 2017-05-23 RX ORDER — LISINOPRIL 20 MG/1
20 TABLET ORAL DAILY
Status: DISCONTINUED | OUTPATIENT
Start: 2017-05-23 | End: 2017-05-24

## 2017-05-23 RX ORDER — HYDRALAZINE HYDROCHLORIDE 20 MG/ML
10 INJECTION INTRAMUSCULAR; INTRAVENOUS
Status: DISCONTINUED | OUTPATIENT
Start: 2017-05-23 | End: 2017-05-23

## 2017-05-23 RX ORDER — FENTANYL CITRATE 50 UG/ML
50 INJECTION, SOLUTION INTRAMUSCULAR; INTRAVENOUS ONCE
Status: COMPLETED | OUTPATIENT
Start: 2017-05-23 | End: 2017-05-23

## 2017-05-23 RX ORDER — MAGNESIUM SULFATE 1 G/100ML
1 INJECTION INTRAVENOUS ONCE
Status: COMPLETED | OUTPATIENT
Start: 2017-05-23 | End: 2017-05-23

## 2017-05-23 RX ORDER — POTASSIUM CHLORIDE 750 MG/1
40 TABLET, FILM COATED, EXTENDED RELEASE ORAL
Status: COMPLETED | OUTPATIENT
Start: 2017-05-23 | End: 2017-05-23

## 2017-05-23 RX ORDER — HYDRALAZINE HYDROCHLORIDE 20 MG/ML
20 INJECTION INTRAMUSCULAR; INTRAVENOUS
Status: DISCONTINUED | OUTPATIENT
Start: 2017-05-23 | End: 2017-05-26 | Stop reason: HOSPADM

## 2017-05-23 RX ORDER — AMOXICILLIN 250 MG
1 CAPSULE ORAL DAILY
Status: DISCONTINUED | OUTPATIENT
Start: 2017-05-23 | End: 2017-05-26 | Stop reason: HOSPADM

## 2017-05-23 RX ORDER — LANOLIN ALCOHOL/MO/W.PET/CERES
400 CREAM (GRAM) TOPICAL 2 TIMES DAILY
Status: DISCONTINUED | OUTPATIENT
Start: 2017-05-23 | End: 2017-05-26 | Stop reason: HOSPADM

## 2017-05-23 RX ORDER — MORPHINE SULFATE 15 MG/1
45 TABLET, FILM COATED, EXTENDED RELEASE ORAL EVERY 12 HOURS
Status: DISCONTINUED | OUTPATIENT
Start: 2017-05-23 | End: 2017-05-24

## 2017-05-23 RX ORDER — FUROSEMIDE 40 MG/1
40 TABLET ORAL DAILY
Status: DISCONTINUED | OUTPATIENT
Start: 2017-05-23 | End: 2017-05-23

## 2017-05-23 RX ADMIN — SODIUM CHLORIDE: 900 INJECTION, SOLUTION INTRAVENOUS at 15:29

## 2017-05-23 RX ADMIN — PIPERACILLIN SODIUM,TAZOBACTAM SODIUM 3.38 G: 3; .375 INJECTION, POWDER, FOR SOLUTION INTRAVENOUS at 13:02

## 2017-05-23 RX ADMIN — LISINOPRIL 20 MG: 20 TABLET ORAL at 09:10

## 2017-05-23 RX ADMIN — POTASSIUM CHLORIDE 40 MEQ: 750 TABLET, FILM COATED, EXTENDED RELEASE ORAL at 04:46

## 2017-05-23 RX ADMIN — Medication 10 ML: at 13:02

## 2017-05-23 RX ADMIN — HEPARIN SODIUM 5000 UNITS: 5000 INJECTION, SOLUTION INTRAVENOUS; SUBCUTANEOUS at 13:02

## 2017-05-23 RX ADMIN — Medication 10 ML: at 05:29

## 2017-05-23 RX ADMIN — HEPARIN SODIUM 5000 UNITS: 5000 INJECTION, SOLUTION INTRAVENOUS; SUBCUTANEOUS at 21:03

## 2017-05-23 RX ADMIN — MORPHINE SULFATE 45 MG: 15 TABLET, FILM COATED, EXTENDED RELEASE ORAL at 21:00

## 2017-05-23 RX ADMIN — IPRATROPIUM BROMIDE AND ALBUTEROL SULFATE 3 ML: .5; 3 SOLUTION RESPIRATORY (INHALATION) at 07:36

## 2017-05-23 RX ADMIN — HYDRALAZINE HYDROCHLORIDE 20 MG: 20 INJECTION INTRAMUSCULAR; INTRAVENOUS at 09:14

## 2017-05-23 RX ADMIN — FENTANYL CITRATE 50 MCG: 50 INJECTION, SOLUTION INTRAMUSCULAR; INTRAVENOUS at 06:29

## 2017-05-23 RX ADMIN — HEPARIN SODIUM 5000 UNITS: 5000 INJECTION, SOLUTION INTRAVENOUS; SUBCUTANEOUS at 06:00

## 2017-05-23 RX ADMIN — SODIUM CHLORIDE: 900 INJECTION, SOLUTION INTRAVENOUS at 05:29

## 2017-05-23 RX ADMIN — HYDRALAZINE HYDROCHLORIDE 10 MG: 20 INJECTION INTRAMUSCULAR; INTRAVENOUS at 05:36

## 2017-05-23 RX ADMIN — POTASSIUM CHLORIDE 40 MEQ: 750 TABLET, FILM COATED, EXTENDED RELEASE ORAL at 15:28

## 2017-05-23 RX ADMIN — FENTANYL CITRATE 25 MCG: 50 INJECTION, SOLUTION INTRAMUSCULAR; INTRAVENOUS at 00:53

## 2017-05-23 RX ADMIN — FENTANYL CITRATE 25 MCG: 50 INJECTION, SOLUTION INTRAMUSCULAR; INTRAVENOUS at 04:54

## 2017-05-23 RX ADMIN — Medication 400 MG: at 15:29

## 2017-05-23 RX ADMIN — IPRATROPIUM BROMIDE AND ALBUTEROL SULFATE 3 ML: .5; 3 SOLUTION RESPIRATORY (INHALATION) at 16:05

## 2017-05-23 RX ADMIN — IPRATROPIUM BROMIDE AND ALBUTEROL SULFATE 3 ML: .5; 3 SOLUTION RESPIRATORY (INHALATION) at 12:34

## 2017-05-23 RX ADMIN — PIPERACILLIN SODIUM,TAZOBACTAM SODIUM 3.38 G: 3; .375 INJECTION, POWDER, FOR SOLUTION INTRAVENOUS at 05:36

## 2017-05-23 RX ADMIN — Medication 10 ML: at 21:03

## 2017-05-23 RX ADMIN — IPRATROPIUM BROMIDE AND ALBUTEROL SULFATE 3 ML: .5; 3 SOLUTION RESPIRATORY (INHALATION) at 22:22

## 2017-05-23 RX ADMIN — Medication 10 ML: at 21:04

## 2017-05-23 RX ADMIN — CARVEDILOL 12.5 MG: 12.5 TABLET, FILM COATED ORAL at 08:06

## 2017-05-23 RX ADMIN — MAGNESIUM SULFATE HEPTAHYDRATE 1 G: 1 INJECTION, SOLUTION INTRAVENOUS at 04:50

## 2017-05-23 RX ADMIN — ONDANSETRON 4 MG: 2 INJECTION INTRAMUSCULAR; INTRAVENOUS at 09:07

## 2017-05-23 RX ADMIN — PIPERACILLIN SODIUM,TAZOBACTAM SODIUM 3.38 G: 3; .375 INJECTION, POWDER, FOR SOLUTION INTRAVENOUS at 21:03

## 2017-05-23 RX ADMIN — MORPHINE SULFATE 30 MG: 15 TABLET, EXTENDED RELEASE ORAL at 08:05

## 2017-05-23 RX ADMIN — CARVEDILOL 12.5 MG: 12.5 TABLET, FILM COATED ORAL at 17:17

## 2017-05-23 RX ADMIN — SODIUM CHLORIDE 40 MG: 9 INJECTION INTRAMUSCULAR; INTRAVENOUS; SUBCUTANEOUS at 08:06

## 2017-05-23 NOTE — PROGRESS NOTES
[facility]    DISCUSSION & [facility] Day: 3    History  &  ROS:  Unable to obtain due to patient factors : Disease State       · 5/23/2017-8:50 AM-patient more awake today. Appreciate palliative care adjusting pain medications. Personally visualized chest x-ray shows upper lobe infiltrate. Continue IV antibiotics for now and recheck x-ray in the morning. Replenish abnormal electrolytes today. He is not using the noninvasive ventilator anymore. We may get rid of that as well as the Ortega catheter. Case discussed with RN and we both feel that we need to get him settled down before getting him out of the floor. He may be able to go to the floor later today or tomorrow. Blood pressure medicines adjusted. Remove Ortega. Physical therapy. · 5/22/17 - Patient admitted with hypercapnic and hypoxic respiratory failure with opiate and benzodiazepines onboard suppressing his respirations. He also has acute renal insufficiency. Currently, he is somnolent but arousable. Not synchronizing with the noninvasive ventilator due to higher rate. Decreasing rate and giving some nebulizers. He's completed his Narcan. He is arousable at this point. Due to chronic narcotic use we'll discontinue the Narcan drip. Tylenol level was 4. Recheck level. If it is not elevated, medications may be discontinued. Continue ICU care. Ortega catheter in place with good urine output. Creatinine coming down. Sequential compression devices are in place. Continue routine ICU care.        Active Hospital Problems    Diagnosis Date Noted    Acute respiratory failure ([facility] 05/21/2017    Drug overdose 05/21/2017    Elevated troponin 05/21/2017    Acute renal failure (ARF) ([facility] [address] 05/21/2017         Renal failure   - ATN/Hypotension-renal US normal  · Monitor      Abnormal electrolytes   - Monitoring  · Replete as needed      Hypertension   - Present on admission  · Restart home meds      CHF   - EF 35% -Now up to 55-60%  · Continue blood pressure medicines  · Restart ACE I      Drug OD /Chronic pain  - Pain management  · Adjust medicines per pain management           PHYSICAL        Intake/Output Summary (Last 24 hours) at 17 0852  Last data filed at 17 0800   Gross per 24 hour   Intake          3045.34 ml   Output             4206 ml   Net         -1160.66 ml       Temp (24hrs), Av °F (37.2 °C), Min:97.7 °F (36.5 °C), Max:100.8 °F (38.2 °C)       Visit Vitals    BP (!) 198/99    Pulse 98    Temp 97.9 °F (36.6 °C)    Resp 24    Ht 5' 7\" (1.702 m)    Wt 93.3 kg (205 lb 11 oz)    SpO2 98%    BMI 32.22 kg/m2              General:    Nontoxic   No Distress      Eyes:  Anicteric    Noninjected     ENT:  Moist   No Thrush     Neck:  No Mass   Midline Trachea      CV:  Regular    No Murmur     LUNG:  Clear    Equal BS     GI:  NABS  Nontender     :  Clear Urine  Norm Genitalia      SKEL:  WD WN  No Clubbing     SKIN:  Perfused    No Drug Rash     NEURO:  A & O x 3  Non Focal     PSYCH:  Nonagitated  Good Insight        DATA    [x]  Reviewed: O2 / PAP / Ventilator  O2 Device: Room air (17 0736)    Ideal body weight: 66.1 kg (145 lb 11.6 oz)  Adjusted ideal body weight: 77 kg (169 lb 11.4 oz)                       Mode:  NIPPV                       Rate:           Tidal Volume: 650 ml                Pressure:  10 cm H2O                      FiO2:  40 %                    PEEP:  5 cm H20                        PIP:  14 cm H2O  Minute Ventilation:  9 l/min    Recent Labs      17   0544  17   0105  17   2316   PHI  7.253*  7.242*  7.166*   PCO2I  61.3*  54.9*  66.8*   PO2I  49*  94  96       Recent Labs      17   0311   17   1931   WBC  12.8*   < >   --    HGB  10.9*   < >   --    PLT  268   < >   --    INR   --    --   1.0   APTT   --    --   25.4    < > = values in this interval not displayed.        Recent Labs      17   0311  17   0335   NA  139   --    K  3.1* --    CL  103   --    CO2  29   --    GLU  112*   --    BUN  15   --    CREA  0.84   --    CA  8.7   --    MG  1.5*   --    PHOS  0.9*   --    LAC   --   0.6   ALB  2.6*   --    SGOT  23   --    ALT  26   --       IMAGING     Results from Hospital Encounter encounter on 05/21/17   XR CHEST PORT   Narrative EXAM:  XR CHEST PORT    INDICATION:  dyspnea    COMPARISON:  5/21/2017    FINDINGS: A portable AP radiograph of the chest was obtained at 0345 hours. The  patient is on a cardiac monitor. The heart size is normal.    The lungs demonstrate new developing area of opacity measure possibly 3 x 2 cm  and the left upper lung zone that likely represents developing pneumonia. Follow-up to resolution is suggested. Right lung is clear. Impression IMPRESSION:  1. New opacity in left upper lobe likely reflect developing pneumonia follow-up  to resolution is suggested. XR CHEST PORT   Narrative Clinical history: Opioid overdose  INDICATION: Opiate overdose    COMPARISON: 5/16/2017    FINDINGS:   AP semiupright view of the chest is obtained . The cardiopericardial silhouette  is stable. There is no pleural effusion, pneumothorax or focal consolidation  present. Impression IMPRESSION:    No acute thoracic disease. Results from [place] encounter on 05/08/17   XR CHEST PA LAT   Narrative EXAM:  XR CHEST PA LAT    INDICATION:  Pneumonia. Follow-up abnormal chest radiograph. COMPARISON: 5/13/2017    TECHNIQUE: PA and lateral chest views    FINDINGS: Cardiac monitoring wires overlie the thorax. The cardiomediastinal  contours are stable. The pulmonary vasculature is within normal limits. Left lower lobe airspace opacity is nearly completely resolved. The right lung  and pleural spaces are clear. There is no pneumothorax. The bones and upper  abdomen are stable. Impression IMPRESSION:    Newly completely resolved left lower lobe airspace opacity. Clear right lung. Results from Hospital Encounter encounter on 05/08/17   CT HEAD WO CONT   Narrative EXAM:  CT HEAD WITHOUT CONTRAST  INDICATION: Confusion and delirium with unexplained altered level of  consciousness. COMPARISON: None. CONTRAST: None. TECHNIQUE: Unenhanced CT of the head was performed using 5 mm images. Brain and  bone windows were generated. Sagittal and coronal reformations were generated. CT dose reduction was achieved through use of a standardized protocol tailored  for this examination and automatic exposure control for dose modulation. CT dose  reduction was achieved through use of a standardized protocol tailored for this  examination and automatic exposure control for dose modulation. Adaptive  statistical iterative reconstruction (ASIR) was utilized for this examination. FINDINGS:  The ventricles and sulci are normal in size, shape and configuration and  midline. There is atherosclerotic calcification and minimal patchy decreased  attenuation in the periventricular white matter and basal ganglia consistent  with small vessel disease. There is an old low-attenuation left occipital  infarct. There is no intracranial hemorrhage. There is no extra-axial  collection, mass, mass effect or midline shift. The basilar cisterns are open. No acute infarct is identified. The bone windows demonstrate no abnormalities. The visualized portions of the paranasal sinuses and mastoid air cells are  clear. Impression IMPRESSION: Atherosclerosis with microvascular disease and old left occipital  infarct. MEDICAL HISTORY    PMH:  has a past medical history of Hypertension; Ill-defined condition; and Neurological disorder. PSH:  has a past surgical history that includes orthopaedic and cardiac surg procedure unlist.    FHX: family history is not on file. SHX:  reports that he has been smoking. He does not have any smokeless tobacco history on file.  He reports that he does not drink alcohol or use illicit drugs.    ALLERGY   Allergies   Allergen Reactions    Betadine [Povidone-Iodine] Hives    Iodine Rash      MEDICINES   Current Facility-Administered Medications   Medication    ELECTROLYTE REPLACEMENT PROTOCOL    potassium phosphate 22.38 mmol in 0.9% sodium chloride 250 mL infusion    hydrALAZINE (APRESOLINE) 20 mg/mL injection 20 mg    lisinopril (PRINIVIL, ZESTRIL) tablet 20 mg    ondansetron (ZOFRAN) injection 4 mg    senna-docusate (PERICOLACE) 8.6-50 mg per tablet 1 Tab    albuterol-ipratropium (DUO-NEB) 2.5 MG-0.5 MG/3 ML    pantoprazole (PROTONIX) 40 mg in sodium chloride 0.9 % 10 mL injection    piperacillin-tazobactam (ZOSYN) 3.375 g in 0.9% sodium chloride (MBP/ADV) 100 mL    carvedilol (COREG) tablet 12.5 mg    morphine CR (MS CONTIN) tablet 30 mg    fentaNYL citrate (PF) injection 25 mcg    sodium chloride (NS) flush 5-10 mL    sodium chloride (NS) flush 5-10 mL    sodium chloride (NS) flush 5-10 mL    sodium chloride (NS) flush 5-10 mL    heparin (porcine) injection 5,000 Units       Pauly Coronado MD [facility]
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
## 2017-05-23 NOTE — PROGRESS NOTES
Nephrology Progress Note  Rocio Waldrop  Date of Admission : 5/21/2017    CC: Follow up for NESS       Assessment and Plan     NESS:  - likely from volume depletion + ATN from hypotension  - Renal U/S normal  - Cr normal     HypoK/phos/mag:  - replete PRN    Hypertension:  - resume outpt BP meds     CHF with EF of 30-35%:  - was on ace and lasix at home  - last ECHO on 5/12/17 showed an EF of 55-60%     Drug O/D:  - per pulm/CCM team     Resp Acidosis:  - improved     Hypovolemia:  - stable      Will sign off case. Please call with any questions or concerns. Interval History:  Seen and examined. More awake, off BiPAP. Cr normal.  UOP stable. No cp, sob, n/v/d, fevers or chills reported. Current Medications: all current  Medications have been eviewed in EPIC  Review of Systems: Pertinent items are noted in HPI. Objective:  Vitals:    Vitals:    05/23/17 0700 05/23/17 0730 05/23/17 0736 05/23/17 0800   BP: (!) 200/92 (!) 198/99     Pulse: 99 98     Resp: 25 24     Temp:    97.9 °F (36.6 °C)   SpO2: 99% 95% 98%    Weight:       Height:         Intake and Output:  05/23 0701 - 05/23 1900  In: -   Out: 601 [Urine:600]  05/21 1901 - 05/23 0700  In: 6024.7 [P.O.:100; I.V.:5904.7]  Out: 2101 [Urine:5230]    Physical Examination:  Pt intubated     No  General: NAD,Conversant   Neck:  Supple, no mass  Resp:  Lungs CTA B/L, no wheezing , normal respiratory effort  CV:  RRR,  no murmur or rub, no LE edema  GI:  Soft, NT, + Bowel sounds, no hepatosplenomegaly  Neurologic:  Non focal  Psych:             AAO x 3 appropriate affect   Skin:  No Rash    []    High complexity decision making was performed  []    Patient is at high-risk of decompensation with multiple organ involvement    Lab Data Personally Reviewed: I have reviewed all the pertinent labs, microbiology data and radiology studies during assessment.     Recent Labs      05/23/17   0311  05/22/17   0122  05/21/17 1931 05/21/17 1927   NA  139  140 --   138   K  3.1*  4.1   --   4.5   CL  103  105   --   105   CO2  29  24   --   25   GLU  112*  108*   --   106*   BUN  15  30*   --   35*   CREA  0.84  2.03*   --   3.94*   CA  8.7  8.2*   --   7.9*   MG  1.5*   --    --    --    PHOS  0.9*   --    --    --    ALB  2.6*   --    --   2.9*   SGOT  23   --    --   43*   ALT  26   --    --   38   INR   --    --   1.0   --      Recent Labs      05/23/17   0311  05/22/17   0122  05/21/17   1927   WBC  12.8*  15.8*  10.4   HGB  10.9*  11.5*  12.5   HCT  32.6*  36.5*  39.6   PLT  268  325  326     No results found for: SDES  Lab Results   Component Value Date/Time    Culture result: NO GROWTH AFTER 17 HOURS 05/22/2017 12:09 PM    Culture result: NO ESBL  ISOLATED 05/14/2017 06:15 AM    Culture result: NO ESBL  ISOLATED 05/14/2017 06:10 AM     Recent Results (from the past 24 hour(s))   ACETAMINOPHEN    Collection Time: 05/22/17  9:01 AM   Result Value Ref Range    ACETAMINOPHEN 3 (L) 10 - 30 ug/mL   URINALYSIS W/ REFLEX CULTURE    Collection Time: 05/22/17 12:09 PM   Result Value Ref Range    Color YELLOW/STRAW      Appearance CLEAR CLEAR      Specific gravity 1.020 1.003 - 1.030      pH (UA) 5.0 5.0 - 8.0      Protein NEGATIVE  NEG mg/dL    Glucose NEGATIVE  NEG mg/dL    Ketone TRACE (A) NEG mg/dL    Bilirubin NEGATIVE  NEG      Blood MODERATE (A) NEG      Urobilinogen 0.2 0.2 - 1.0 EU/dL    Nitrites NEGATIVE  NEG      Leukocyte Esterase NEGATIVE  NEG      WBC 0-4 0 - 4 /hpf    RBC 0-5 0 - 5 /hpf    Epithelial cells FEW FEW /lpf    Bacteria NEGATIVE  NEG /hpf    UA:UC IF INDICATED CULTURE NOT INDICATED BY UA RESULT CNI     CULTURE, BLOOD, PAIRED    Collection Time: 05/22/17 12:09 PM   Result Value Ref Range    Special Requests: NO SPECIAL REQUESTS      Culture result: NO GROWTH AFTER 17 HOURS     METABOLIC PANEL, COMPREHENSIVE    Collection Time: 05/23/17  3:11 AM   Result Value Ref Range    Sodium 139 136 - 145 mmol/L    Potassium 3.1 (L) 3.5 - 5.1 mmol/L    Chloride 103 97 - 108 mmol/L    CO2 29 21 - 32 mmol/L    Anion gap 7 5 - 15 mmol/L    Glucose 112 (H) 65 - 100 mg/dL    BUN 15 6 - 20 MG/DL    Creatinine 0.84 0.70 - 1.30 MG/DL    BUN/Creatinine ratio 18 12 - 20      GFR est AA >60 >60 ml/min/1.73m2    GFR est non-AA >60 >60 ml/min/1.73m2    Calcium 8.7 8.5 - 10.1 MG/DL    Bilirubin, total 0.7 0.2 - 1.0 MG/DL    ALT (SGPT) 26 12 - 78 U/L    AST (SGOT) 23 15 - 37 U/L    Alk. phosphatase 74 45 - 117 U/L    Protein, total 6.4 6.4 - 8.2 g/dL    Albumin 2.6 (L) 3.5 - 5.0 g/dL    Globulin 3.8 2.0 - 4.0 g/dL    A-G Ratio 0.7 (L) 1.1 - 2.2     CBC WITH AUTOMATED DIFF    Collection Time: 05/23/17  3:11 AM   Result Value Ref Range    WBC 12.8 (H) 4.1 - 11.1 K/uL    RBC 3.63 (L) 4.10 - 5.70 M/uL    HGB 10.9 (L) 12.1 - 17.0 g/dL    HCT 32.6 (L) 36.6 - 50.3 %    MCV 89.8 80.0 - 99.0 FL    MCH 30.0 26.0 - 34.0 PG    MCHC 33.4 30.0 - 36.5 g/dL    RDW 14.4 11.5 - 14.5 %    PLATELET 662 555 - 141 K/uL    NEUTROPHILS 76 (H) 32 - 75 %    LYMPHOCYTES 11 (L) 12 - 49 %    MONOCYTES 11 5 - 13 %    EOSINOPHILS 2 0 - 7 %    BASOPHILS 0 0 - 1 %    ABS. NEUTROPHILS 9.7 (H) 1.8 - 8.0 K/UL    ABS. LYMPHOCYTES 1.4 0.8 - 3.5 K/UL    ABS. MONOCYTES 1.4 (H) 0.0 - 1.0 K/UL    ABS. EOSINOPHILS 0.3 0.0 - 0.4 K/UL    ABS. BASOPHILS 0.0 0.0 - 0.1 K/UL    DF SMEAR SCANNED      RBC COMMENTS ANISOCYTOSIS  1+       MAGNESIUM    Collection Time: 05/23/17  3:11 AM   Result Value Ref Range    Magnesium 1.5 (L) 1.6 - 2.4 mg/dL   PHOSPHORUS    Collection Time: 05/23/17  3:11 AM   Result Value Ref Range    Phosphorus 0.9 (LL) 2.6 - 4.7 MG/DL                 Narendra Conti MD  [address]  Phone - [phone]   Fax - [phone]  [email or web address]

## 2017-05-23 NOTE — ROUTINE PROCESS
1930: Bedside and Verbal shift change report given to Hollis Pal RN (oncoming nurse) by Grzegorz Steele RN (offgoing nurse). Report included the following information SBAR, Kardex, Procedure Summary, Intake/Output, MAR, Accordion, Recent Results, Med Rec Status, Cardiac Rhythm NSR and Alarm Parameters . 0730: Bedside and Verbal shift change report given to Grzegorz Steele RN (oncoming nurse) by Hollis Pal RN (offgoing nurse). Report included the following information SBAR, Kardex, Intake/Output, MAR, Accordion, Recent Results, Med Rec Status, Cardiac Rhythm NSR-ST low 100s and Alarm Parameters .
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
## 2017-05-23 NOTE — ROUTINE PROCESS
1930: Bedside and Verbal shift change report given to Munira Thompson RN (oncoming nurse) by Andreas Stapleton RN (offgoing nurse). Report included the following information SBAR, Kardex, Intake/Output, MAR, Accordion, Recent Results, Med Rec Status, Cardiac Rhythm NSR and Alarm Parameters . 0730: Bedside and Verbal shift change report given to Kingsley Shultz RN (oncoming nurse) by Munira Thompson RN (offgoing nurse). Report included the following information SBAR, Kardex, ED Summary, Procedure Summary, Intake/Output, MAR, Accordion, Recent Results, Med Rec Status, Cardiac Rhythm NSR and Alarm Parameters .

## 2017-05-23 NOTE — PROGRESS NOTES
0745-Bedside, Verbal and Written shift change report given to DAYDAY GomezRN (oncoming nurse) by Juan Carlos Nelson (offgoing nurse). Report included the following information SBAR, Kardex, ED Summary, Intake/Output, MAR, Accordion, Recent Results, Cardiac Rhythm NSR w/ T-Wave inversions  and Alarm Parameters . Assessment as completed per flowsheet. Sitter at bedside as pt remains restless/attempting to pull off leads and lines. 0840-dr niño at bedside aware pt continues to have altered mental status. Also aware SBP remains 190-200's, new orders obtained. 1200-pt calm/sleeping, arouses easily, following commands, satisfied w/ current pain control. 1545-pt up walking w/ physical therapy, ending w/ sitting up in bedside recliner, tolerated well. 1900-pt assisted back to bed w/ side by assist, tolerated well. 1930-Bedside and Verbal shift change report given to Juan Carlos Nelson (oncoming nurse) by Kamari Reeves (offgoing nurse). Report included the following information SBAR, Kardex, Intake/Output, MAR, Accordion, Recent Results, Cardiac Rhythm NSR w/ inverted T waves and Alarm Parameters .      Shift Summary- Pt much less restless and agitated this shift. Pt slept majority of shift, remained easily aroused and mostly cooperative. Decreased appetite noted. MS Contin increased to 45mg Q12 hours by palliative medicine .

## 2017-05-23 NOTE — PROGRESS NOTES
Shift summary: Pt alert, restless, intermittently agitated throughout shift. Disoriented to time and situation. Complaining of chronic neck and back pain throughout shift. Pain worsening since 0400- pt moaning, states he \"feels awful\". 25 mcg Fentanyl administered q4 hours PRN per order x2 doses. CAM-ICU positive. PRN 10 mg hydralazine administered once for SBP >160. Spoke with Dr. Bernie Blancas with Palliative Care at 88- aware of pt moaning constantly and screaming out in pain despite PRN Fentanyl administered per order. Orders for 50 mcg Fentanyl once now. Pt had no relief from extra 50 mcg Fentanyl dose- remains restless, moaning, yelling out in pain stating he \"feels terrible\". U/O 150-300 ml/hr since MIVF stopped at midnight per Dr. Renetta Melton. RIMMA Hogan aware of increased SBP 190s-200s after 0700- orders for increase in PRN hydralazine to 20 mg. Pt pulled out 2 IVs this shift- Sitter at bedside. Pt's wife states she is not able to visit pt in hospital and would like daily phone call updates from the doctor (either hospitalist or palliative care). Pt's wife is Linn Wilder: [phone].
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
## 2017-05-23 NOTE — PROGRESS NOTES
Attended interdisciplinary rounds in ICU. : Rev. Ez Martinez.  Jorge Reddy; [facility]; to contact [phone] Berny Joshi call: [phone]

## 2017-05-23 NOTE — PROGRESS NOTES
Spiritual Care Assessment/Progress Notes    [facility] [id number]  [id number]    1955  62 y.o.  male    Patient Telephone Number: [phone] (home)   Samaritan Affiliation: Other   Language: English   Extended Emergency Contact Information  Primary Emergency Contact: [place]olin Phone: [phone]  Relation: Spouse   Patient Active Problem List    Diagnosis Date Noted    Chronic diastolic heart failure ([facility] [address] 05/22/2017    Acute respiratory failure ([facility] [address] 05/21/2017    Drug overdose 05/21/2017    Elevated troponin 05/21/2017    Acute renal failure (ARF) ([facility] [address] 05/21/2017    Chronic pain 05/08/2017        Date: 5/23/2017       Level of Samaritan/Spiritual Activity:  []         Involved in leonardo tradition/spiritual practice    []         Not involved in leonardo tradition/spiritual practice  []         Spiritually oriented    []         Claims no spiritual orientation    []         seeking spiritual identity  []         Feels alienated from Hindu practice/tradition  []         Feels angry about Hindu practice/tradition  []         Spirituality/Hindu tradition a resource for coping at this time.   [x]         Not able to assess due to medical condition    Services Provided Today:  []         crisis intervention    []         reading Scriptures  []         spiritual assessment    []         prayer  []         empathic listening/emotional support  []         rites and rituals (cite in comments)  []         life review     []         Hindu support  []         theological development   []         advocacy  []         ethical dialog     []         blessing  []         bereavement support    []         support to family  []         anticipatory grief support   []         help with AMD  []         spiritual guidance    []         meditation      Spiritual Care Needs  []         Emotional Support  []         Spiritual/Samaritan Care  [] Loss/Adjustment  []         Advocacy/Referral                /Ethics  []         No needs expressed at               this time  []         Other: (note in               comments)  Spiritual Care Plan  []         Follow up visits with               pt/family  []         Provide materials  []         Schedule sacraments  []         Contact Community               Clergy  []         Follow up as needed  []         Other: (note in               comments)     Comments: Saw Mr Triny Cross in ICU-10 for initial spiritual assessment. Mr Triny Cross appeared to be sleeping soundly and no family was at the bedside. Had silent prayer for patient. Left note for patient and family assuring them of prayers on their behalf and of  availability for support. Plan: Chaplains will attempt to see patient for assessment when he is more alert and will remain available for patient/family support as needed/able. : Rev. Kendra Mckeon.  Lida Mathews; [facility], to contact [phone] Berny Joshi call: [phone]

## 2017-05-24 LAB
ALBUMIN SERPL BCP-MCNC: 2.7 G/DL (ref 3.5–5)
ALBUMIN/GLOB SERPL: 0.7 {RATIO} (ref 1.1–2.2)
ALP SERPL-CCNC: 72 U/L (ref 45–117)
ALT SERPL-CCNC: 23 U/L (ref 12–78)
ANION GAP BLD CALC-SCNC: 9 MMOL/L (ref 5–15)
AST SERPL W P-5'-P-CCNC: 17 U/L (ref 15–37)
BASOPHILS # BLD AUTO: 0 K/UL (ref 0–0.1)
BASOPHILS # BLD: 0 % (ref 0–1)
BILIRUB SERPL-MCNC: 0.8 MG/DL (ref 0.2–1)
BUN SERPL-MCNC: 10 MG/DL (ref 6–20)
BUN/CREAT SERPL: 13 (ref 12–20)
CALCIUM SERPL-MCNC: 8.7 MG/DL (ref 8.5–10.1)
CHLORIDE SERPL-SCNC: 104 MMOL/L (ref 97–108)
CO2 SERPL-SCNC: 25 MMOL/L (ref 21–32)
CREAT SERPL-MCNC: 0.76 MG/DL (ref 0.7–1.3)
EOSINOPHIL # BLD: 0.3 K/UL (ref 0–0.4)
EOSINOPHIL NFR BLD: 4 % (ref 0–7)
ERYTHROCYTE [DISTWIDTH] IN BLOOD BY AUTOMATED COUNT: 14.6 % (ref 11.5–14.5)
GBM IGG SER-ACNC: 6 UNITS (ref 0–20)
GLOBULIN SER CALC-MCNC: 3.8 G/DL (ref 2–4)
GLUCOSE SERPL-MCNC: 97 MG/DL (ref 65–100)
HCT VFR BLD AUTO: 33 % (ref 36.6–50.3)
HGB BLD-MCNC: 11.1 G/DL (ref 12.1–17)
LACTATE SERPL-SCNC: 0.5 MMOL/L (ref 0.4–2)
LYMPHOCYTES # BLD AUTO: 20 % (ref 12–49)
LYMPHOCYTES # BLD: 1.8 K/UL (ref 0.8–3.5)
MAGNESIUM SERPL-MCNC: 1.8 MG/DL (ref 1.6–2.4)
MCH RBC QN AUTO: 29.8 PG (ref 26–34)
MCHC RBC AUTO-ENTMCNC: 33.6 G/DL (ref 30–36.5)
MCV RBC AUTO: 88.7 FL (ref 80–99)
MONOCYTES # BLD: 0.8 K/UL (ref 0–1)
MONOCYTES NFR BLD AUTO: 10 % (ref 5–13)
NEUTS SEG # BLD: 5.9 K/UL (ref 1.8–8)
NEUTS SEG NFR BLD AUTO: 66 % (ref 32–75)
PHOSPHATE SERPL-MCNC: 2 MG/DL (ref 2.6–4.7)
PLATELET # BLD AUTO: 266 K/UL (ref 150–400)
POTASSIUM SERPL-SCNC: 3.4 MMOL/L (ref 3.5–5.1)
PROT SERPL-MCNC: 6.5 G/DL (ref 6.4–8.2)
RBC # BLD AUTO: 3.72 M/UL (ref 4.1–5.7)
SODIUM SERPL-SCNC: 138 MMOL/L (ref 136–145)
WBC # BLD AUTO: 8.8 K/UL (ref 4.1–11.1)

## 2017-05-24 PROCEDURE — 65660000000 HC RM CCU STEPDOWN

## 2017-05-24 PROCEDURE — 74011250636 HC RX REV CODE- 250/636: Performed by: EMERGENCY MEDICINE

## 2017-05-24 PROCEDURE — 74011250637 HC RX REV CODE- 250/637: Performed by: PHYSICAL MEDICINE & REHABILITATION

## 2017-05-24 PROCEDURE — 77030029684 HC NEB SM VOL KT MONA -A

## 2017-05-24 PROCEDURE — 94640 AIRWAY INHALATION TREATMENT: CPT

## 2017-05-24 PROCEDURE — 74011250637 HC RX REV CODE- 250/637: Performed by: EMERGENCY MEDICINE

## 2017-05-24 PROCEDURE — 74011000250 HC RX REV CODE- 250: Performed by: STUDENT IN AN ORGANIZED HEALTH CARE EDUCATION/TRAINING PROGRAM

## 2017-05-24 PROCEDURE — 92526 ORAL FUNCTION THERAPY: CPT | Performed by: SPEECH-LANGUAGE PATHOLOGIST

## 2017-05-24 PROCEDURE — 83735 ASSAY OF MAGNESIUM: CPT | Performed by: INTERNAL MEDICINE

## 2017-05-24 PROCEDURE — 74011250637 HC RX REV CODE- 250/637: Performed by: INTERNAL MEDICINE

## 2017-05-24 PROCEDURE — 74011250636 HC RX REV CODE- 250/636: Performed by: FAMILY MEDICINE

## 2017-05-24 PROCEDURE — 36415 COLL VENOUS BLD VENIPUNCTURE: CPT | Performed by: INTERNAL MEDICINE

## 2017-05-24 PROCEDURE — 74011250636 HC RX REV CODE- 250/636: Performed by: PHYSICIAN ASSISTANT

## 2017-05-24 PROCEDURE — 84100 ASSAY OF PHOSPHORUS: CPT | Performed by: INTERNAL MEDICINE

## 2017-05-24 PROCEDURE — 85025 COMPLETE CBC W/AUTO DIFF WBC: CPT | Performed by: INTERNAL MEDICINE

## 2017-05-24 PROCEDURE — 74011250636 HC RX REV CODE- 250/636: Performed by: INTERNAL MEDICINE

## 2017-05-24 PROCEDURE — 83605 ASSAY OF LACTIC ACID: CPT | Performed by: INTERNAL MEDICINE

## 2017-05-24 PROCEDURE — 74011250636 HC RX REV CODE- 250/636: Performed by: STUDENT IN AN ORGANIZED HEALTH CARE EDUCATION/TRAINING PROGRAM

## 2017-05-24 PROCEDURE — 74011000258 HC RX REV CODE- 258: Performed by: INTERNAL MEDICINE

## 2017-05-24 PROCEDURE — 74011000250 HC RX REV CODE- 250: Performed by: INTERNAL MEDICINE

## 2017-05-24 PROCEDURE — 80053 COMPREHEN METABOLIC PANEL: CPT | Performed by: INTERNAL MEDICINE

## 2017-05-24 PROCEDURE — C9113 INJ PANTOPRAZOLE SODIUM, VIA: HCPCS | Performed by: INTERNAL MEDICINE

## 2017-05-24 RX ORDER — LISINOPRIL 20 MG/1
40 TABLET ORAL DAILY
Status: DISCONTINUED | OUTPATIENT
Start: 2017-05-25 | End: 2017-05-26 | Stop reason: HOSPADM

## 2017-05-24 RX ORDER — MAGNESIUM SULFATE 1 G/100ML
1 INJECTION INTRAVENOUS ONCE
Status: COMPLETED | OUTPATIENT
Start: 2017-05-24 | End: 2017-05-24

## 2017-05-24 RX ORDER — MORPHINE SULFATE 15 MG/1
30 TABLET, FILM COATED, EXTENDED RELEASE ORAL EVERY 8 HOURS
Status: DISCONTINUED | OUTPATIENT
Start: 2017-05-24 | End: 2017-05-26 | Stop reason: HOSPADM

## 2017-05-24 RX ADMIN — MORPHINE SULFATE 30 MG: 15 TABLET, FILM COATED, EXTENDED RELEASE ORAL at 14:07

## 2017-05-24 RX ADMIN — MAGNESIUM SULFATE HEPTAHYDRATE 1 G: 1 INJECTION, SOLUTION INTRAVENOUS at 10:08

## 2017-05-24 RX ADMIN — PIPERACILLIN SODIUM,TAZOBACTAM SODIUM 3.38 G: 3; .375 INJECTION, POWDER, FOR SOLUTION INTRAVENOUS at 06:27

## 2017-05-24 RX ADMIN — HEPARIN SODIUM 5000 UNITS: 5000 INJECTION, SOLUTION INTRAVENOUS; SUBCUTANEOUS at 06:27

## 2017-05-24 RX ADMIN — Medication 10 ML: at 06:27

## 2017-05-24 RX ADMIN — SODIUM CHLORIDE: 900 INJECTION, SOLUTION INTRAVENOUS at 12:07

## 2017-05-24 RX ADMIN — DOCUSATE SODIUM AND SENNOSIDES 1 TABLET: 8.6; 5 TABLET, FILM COATED ORAL at 08:55

## 2017-05-24 RX ADMIN — Medication 10 ML: at 22:25

## 2017-05-24 RX ADMIN — HEPARIN SODIUM 5000 UNITS: 5000 INJECTION, SOLUTION INTRAVENOUS; SUBCUTANEOUS at 14:07

## 2017-05-24 RX ADMIN — Medication 400 MG: at 10:08

## 2017-05-24 RX ADMIN — CARVEDILOL 12.5 MG: 12.5 TABLET, FILM COATED ORAL at 17:45

## 2017-05-24 RX ADMIN — HYDRALAZINE HYDROCHLORIDE 20 MG: 20 INJECTION INTRAMUSCULAR; INTRAVENOUS at 18:57

## 2017-05-24 RX ADMIN — LISINOPRIL 20 MG: 20 TABLET ORAL at 08:55

## 2017-05-24 RX ADMIN — HYDRALAZINE HYDROCHLORIDE 20 MG: 20 INJECTION INTRAMUSCULAR; INTRAVENOUS at 04:00

## 2017-05-24 RX ADMIN — FENTANYL CITRATE 50 MCG: 50 INJECTION, SOLUTION INTRAMUSCULAR; INTRAVENOUS at 06:54

## 2017-05-24 RX ADMIN — Medication 400 MG: at 17:45

## 2017-05-24 RX ADMIN — PIPERACILLIN SODIUM,TAZOBACTAM SODIUM 3.38 G: 3; .375 INJECTION, POWDER, FOR SOLUTION INTRAVENOUS at 14:07

## 2017-05-24 RX ADMIN — SODIUM CHLORIDE 40 MG: 9 INJECTION INTRAMUSCULAR; INTRAVENOUS; SUBCUTANEOUS at 08:55

## 2017-05-24 RX ADMIN — MORPHINE SULFATE 45 MG: 15 TABLET, FILM COATED, EXTENDED RELEASE ORAL at 08:55

## 2017-05-24 RX ADMIN — MORPHINE SULFATE 30 MG: 15 TABLET, FILM COATED, EXTENDED RELEASE ORAL at 22:25

## 2017-05-24 RX ADMIN — Medication 10 ML: at 14:08

## 2017-05-24 RX ADMIN — HEPARIN SODIUM 5000 UNITS: 5000 INJECTION, SOLUTION INTRAVENOUS; SUBCUTANEOUS at 22:24

## 2017-05-24 RX ADMIN — IPRATROPIUM BROMIDE AND ALBUTEROL SULFATE 3 ML: .5; 3 SOLUTION RESPIRATORY (INHALATION) at 20:56

## 2017-05-24 RX ADMIN — HYDRALAZINE HYDROCHLORIDE 20 MG: 20 INJECTION INTRAMUSCULAR; INTRAVENOUS at 14:07

## 2017-05-24 RX ADMIN — CARVEDILOL 12.5 MG: 12.5 TABLET, FILM COATED ORAL at 08:55

## 2017-05-24 RX ADMIN — IPRATROPIUM BROMIDE AND ALBUTEROL SULFATE 3 ML: .5; 3 SOLUTION RESPIRATORY (INHALATION) at 08:18

## 2017-05-24 NOTE — PROGRESS NOTES
2038: Spoke with patient's wife, Korin Hedrick [phone]. States she is upset because she has not spoken with a physician despite requests to do so. Would like hospitalist, palliative, and case management to call her.   Would also like hospitalist and palliative to call her daily with updates since she is not able to visit him in the hospital.

## 2017-05-24 NOTE — PROGRESS NOTES
Problem: Heart Failure: Day 2  Goal: *Hemodynamically stable  Outcome: Not Progressing Towards Goal  Variance: Patient Condition  Comments: Hypertensive this morning. Providers aware. BP meds modified. Goal: *Optimal pain control at patients stated goal  Outcome: Not Progressing Towards Goal  Variance: Patient Condition  Comments: Increased pain despite scheduled and PRN meds. Palliative care aware. Goal: *Anxiety reduced or absent  Outcome: Not Progressing Towards Goal  Variance: Patient Condition  Comments: Pt anxious this morning.
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
## 2017-05-24 NOTE — PROGRESS NOTES
0730- Bedside shift change report given to Bay Rico RN (oncoming nurse) by Otilia Flores RN (offgoing nurse). Report included the following information SBAR, Kardex, ED Summary, Intake/Output, MAR, Accordion, Recent Results, Med Rec Status, Cardiac Rhythm SR and Alarm Parameters . SHIFT SUMMARY- Uneventful shift, -160, PRN hydralazine given x1 for SBP >160, pain better controlled with MS contin TID from BID, on room air, up in chair all day. 1920- TRANSFER - OUT REPORT:    Verbal report given to Cheryl Miller RN(name) on ALNOZO Briones  being transferred to [facility](unit) for routine progression of care       Report consisted of patients Situation, Background, Assessment and   Recommendations(SBAR). Information from the following report(s) SBAR, Kardex, ED Summary, Intake/Output, MAR, Accordion, Recent Results, Med Rec Status, Cardiac Rhythm SR and Alarm Parameters  was reviewed with the receiving nurse. Lines:   Peripheral IV 05/15/17 Left Wrist (Active)       Peripheral IV 05/23/17 Left Wrist (Active)   Site Assessment Clean, dry, & intact 5/24/2017  4:00 PM   Phlebitis Assessment 0 5/24/2017  4:00 PM   Infiltration Assessment 0 5/24/2017  4:00 PM   Dressing Status Clean, dry, & intact 5/24/2017  4:00 PM   Dressing Type Transparent 5/24/2017  4:00 PM   Hub Color/Line Status Blue; Infusing 5/24/2017  4:00 PM   Action Taken Open ports on tubing capped 5/24/2017  4:00 PM   Alcohol Cap Used Yes 5/24/2017  4:00 PM        Opportunity for questions and clarification was provided.       Patient transported with:   Monitor  Patients medications from home  Registered Nurse
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
## 2017-05-24 NOTE — PROGRESS NOTES
Problem: Dysphagia (Adult)  Goal: *Acute Goals and Plan of Care (Insert Text)  Speech therapy goals  Initiated 5/23/2017   1. Patient will tolerate mechanical soft diet with timely and complete mastication and no s/s of aspiration within 7 days   [id number] TREATMENT  Patient: Ivelisse Kelly (16 y.o. male)  Date: 5/24/2017  Diagnosis: Acute respiratory failure (HCC)  Drug overdose  Elevated troponin  Acute renal failure (ARF) (HCC) Acute respiratory failure (HCC)       Precautions: fall        ASSESSMENT:  Patient tolerating thin liquids via successive straw sips without any difficulty this morning. Refused all additional trials despite encouragement. Per nsg, refused all PO except meds this morning, however did well with meds. Progression toward goals:  [ ]         Improving appropriately and progressing toward goals  [X]         Improving slowly and progressing toward goals  [ ]         Not making progress toward goals and plan of care will be adjusted       PLAN:  Recommendations and Planned Interventions:  --continue mech soft diet. Will follow up at least x1 to ensure tolerance. Suspect mech soft will be safest diet during hospitalization. Patient continues to benefit from skilled intervention to address the above impairments. Continue treatment per established plan of care. Discharge Recommendations: To Be Determined       SUBJECTIVE:   Patient stated no, I don't want anything. When asked if there was any food or drink I could get that he would eat/drink.        OBJECTIVE:   Cognitive and Communication Status:  Neurologic State: Alert, Confused  Orientation Level: Oriented to person, Oriented to place, Disoriented to situation, Disoriented to time  Cognition: Decreased attention/concentration, Follows commands  Perception: Appears intact  Perseveration: No perseveration noted  Safety/Judgement: Not assessed  Dysphagia Treatment:  Oral Assessment:     P.O. Trials:  Patient Position: upright in chair   Vocal quality prior to P.O.: No impairment  Consistency Presented: Thin liquid (refused all other trials )  How Presented: SLP-fed/presented;Straw;Successive swallows     Bolus Acceptance: No impairment  Bolus Formation/Control: No impairment     Propulsion: No impairment  Oral Residue: None  Initiation of Swallow: No impairment  Laryngeal Elevation: Functional  Aspiration Signs/Symptoms: None  Pharyngeal Phase Characteristics: No impairment, issues, or problems         Comments: only accepted a few sips of water than refused all additional PO      Oral Phase Severity: No impairment (for thins )  Pharyngeal Phase Severity : No impairment                                                                                            Pain:  Pain Scale 1: Numeric (0 - 10)  Pain Intensity 1: 6  Pain Location 1: Back;Neck  After treatment:   [X]              Patient left in no apparent distress sitting up in chair  [ ]              Patient left in no apparent distress in bed  [X]              Call bell left within reach  [X]              Nursing notified  [ ]              Caregiver present         COMMUNICATION/EDUCATION:      The patients plan of care including recommendations, planned interventions, and recommended diet changes were discussed with: Registered Nurse. Barbara Collier M.CD.  CCC-SLP   Time Calculation: 9 mins

## 2017-05-24 NOTE — PROGRESS NOTES
Problem: Heart Failure: Day 1  Goal: *Oxygen saturation within defined limits  Outcome: Progressing Towards Goal  Pt on BIPAP 40% with oxygen saturation 100%.

## 2017-05-24 NOTE — PROGRESS NOTES
Spiritual Care Assessment/Progress Notes    Linda Clayton [id number]  [id number]    1955  62 y.o.  male    Patient Telephone Number: [phone] (home)   Rastafarian Affiliation: Other   Language: English   Extended Emergency Contact Information  Primary Emergency Contact: [place] Phone: [phone]  Relation: Spouse   Patient Active Problem List    Diagnosis Date Noted    Chronic diastolic heart failure ([facility] 05/22/2017    Acute respiratory failure ([facility] 05/21/2017    Drug overdose 05/21/2017    Elevated troponin 05/21/2017    Acute renal failure (ARF) ([facility] [address] [address] 05/21/2017    Chronic pain 05/08/2017        Date: 5/24/2017       Level of Rastafarian/Spiritual Activity:  []         Involved in leonardo tradition/spiritual practice    []         Not involved in leonardo tradition/spiritual practice  [x]         Spiritually oriented    []         Claims no spiritual orientation    []         seeking spiritual identity  []         Feels alienated from Mormonism practice/tradition  []         Feels angry about Mormonism practice/tradition  []         Spirituality/Mormonism tradition a resource for coping at this time.   []         Not able to assess due to medical condition    Services Provided Today:  []         crisis intervention    []         reading Scriptures  [x]         spiritual assessment    []         prayer  []         empathic listening/emotional support  []         rites and rituals (cite in comments)  []         life review     []         Mormonism support  []         theological development   []         advocacy  []         ethical dialog     []         blessing  []         bereavement support    []         support to family  []         anticipatory grief support   []         help with AMD  []         spiritual guidance    []         meditation      Spiritual Care Needs  []         Emotional Support  []         Spiritual/Rastafarian Care  [] Loss/Adjustment  []         Advocacy/Referral                /Ethics  [x]         No needs expressed at               this time  []         Other: (note in               comments)  Spiritual Care Plan  []         Follow up visits with               pt/family  []         Provide materials  []         Schedule sacraments  []         Contact Community               Clergy  [x]         Follow up as needed  []         Other: (note in               comments)     Comments: Visited with Mr. Rhae Grande for initial spiritual assessment and to offer support. Pt appeared receptive to  visit, but did not engage easily in conversation at this time. Attempted to explore pt's support system - he shared that he doesn't have much family support. He is  and his wife called nurse's station while I was visiting pt. Pt also shared that he has pets at home. Explored any spiritual needs - none expressed at this time. Assured him of  availability as needed/desired.     Cassi Rodgers , Palliative

## 2017-05-24 NOTE — ROUTINE PROCESS
TRANSFER - IN REPORT:    Verbal report received from Flip Ruiz RN(name) on ALONZO Briones  being received from ICU(unit) for routine progression of care      Report consisted of patients Situation, Background, Assessment and   Recommendations(SBAR). Information from the following report(s) SBAR, ED Summary, Intake/Output, Recent Results, Med Rec Status and Cardiac Rhythm NSR was reviewed with the receiving nurse. Opportunity for questions and clarification was provided. Assessment completed upon patients arrival to unit and care assumed.
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
## 2017-05-24 NOTE — PROGRESS NOTES
Problem: Falls - Risk of  Goal: *Knowledge of fall prevention  Outcome: Not Progressing Towards Goal  Variance: Patient Condition  Comments: Pt confused with altered mental status. Education regarding fall prevention provided frequently. Fall precautions in place. Problem: Pressure Injury - Risk of  Goal: *Prevention of pressure ulcer  Outcome: Progressing Towards Goal  Turns self at least q2 hours throughout shift.

## 2017-05-24 NOTE — PROGRESS NOTES
Shift summary: Uneventful shift. Pt calm, cooperative, alert throughout shift. Confused-oriented to person only. Disoriented to place, time, situation. Received morphine PO per order and 50 mcg Fentanyl x1 dose at 0700 for 7/10 pain. Up with one assist to bedside commonde.
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
## 2017-05-25 LAB
ANION GAP BLD CALC-SCNC: 11 MMOL/L (ref 5–15)
BASOPHILS # BLD AUTO: 0 K/UL (ref 0–0.1)
BASOPHILS # BLD: 0 % (ref 0–1)
BUN SERPL-MCNC: 8 MG/DL (ref 6–20)
BUN/CREAT SERPL: 12 (ref 12–20)
CALCIUM SERPL-MCNC: 9 MG/DL (ref 8.5–10.1)
CHLORIDE SERPL-SCNC: 103 MMOL/L (ref 97–108)
CO2 SERPL-SCNC: 24 MMOL/L (ref 21–32)
CREAT SERPL-MCNC: 0.69 MG/DL (ref 0.7–1.3)
EOSINOPHIL # BLD: 0.4 K/UL (ref 0–0.4)
EOSINOPHIL NFR BLD: 4 % (ref 0–7)
ERYTHROCYTE [DISTWIDTH] IN BLOOD BY AUTOMATED COUNT: 14.5 % (ref 11.5–14.5)
GLUCOSE SERPL-MCNC: 90 MG/DL (ref 65–100)
HCT VFR BLD AUTO: 36.6 % (ref 36.6–50.3)
HGB BLD-MCNC: 12.4 G/DL (ref 12.1–17)
LYMPHOCYTES # BLD AUTO: 21 % (ref 12–49)
LYMPHOCYTES # BLD: 1.9 K/UL (ref 0.8–3.5)
MCH RBC QN AUTO: 30.2 PG (ref 26–34)
MCHC RBC AUTO-ENTMCNC: 33.9 G/DL (ref 30–36.5)
MCV RBC AUTO: 89.1 FL (ref 80–99)
MONOCYTES # BLD: 0.9 K/UL (ref 0–1)
MONOCYTES NFR BLD AUTO: 10 % (ref 5–13)
NEUTS SEG # BLD: 5.7 K/UL (ref 1.8–8)
NEUTS SEG NFR BLD AUTO: 65 % (ref 32–75)
PLATELET # BLD AUTO: 321 K/UL (ref 150–400)
POTASSIUM SERPL-SCNC: 3.6 MMOL/L (ref 3.5–5.1)
RBC # BLD AUTO: 4.11 M/UL (ref 4.1–5.7)
SODIUM SERPL-SCNC: 138 MMOL/L (ref 136–145)
WBC # BLD AUTO: 8.9 K/UL (ref 4.1–11.1)

## 2017-05-25 PROCEDURE — 74011250636 HC RX REV CODE- 250/636: Performed by: PHYSICIAN ASSISTANT

## 2017-05-25 PROCEDURE — 74011250637 HC RX REV CODE- 250/637: Performed by: INTERNAL MEDICINE

## 2017-05-25 PROCEDURE — 94640 AIRWAY INHALATION TREATMENT: CPT

## 2017-05-25 PROCEDURE — 74011250636 HC RX REV CODE- 250/636: Performed by: FAMILY MEDICINE

## 2017-05-25 PROCEDURE — 85025 COMPLETE CBC W/AUTO DIFF WBC: CPT | Performed by: INTERNAL MEDICINE

## 2017-05-25 PROCEDURE — 74011250636 HC RX REV CODE- 250/636: Performed by: INTERNAL MEDICINE

## 2017-05-25 PROCEDURE — C9113 INJ PANTOPRAZOLE SODIUM, VIA: HCPCS | Performed by: INTERNAL MEDICINE

## 2017-05-25 PROCEDURE — 74011000250 HC RX REV CODE- 250: Performed by: INTERNAL MEDICINE

## 2017-05-25 PROCEDURE — 74011250637 HC RX REV CODE- 250/637: Performed by: STUDENT IN AN ORGANIZED HEALTH CARE EDUCATION/TRAINING PROGRAM

## 2017-05-25 PROCEDURE — 74011250637 HC RX REV CODE- 250/637: Performed by: PHYSICAL MEDICINE & REHABILITATION

## 2017-05-25 PROCEDURE — 80048 BASIC METABOLIC PNL TOTAL CA: CPT | Performed by: INTERNAL MEDICINE

## 2017-05-25 PROCEDURE — 36415 COLL VENOUS BLD VENIPUNCTURE: CPT | Performed by: INTERNAL MEDICINE

## 2017-05-25 PROCEDURE — 65660000000 HC RM CCU STEPDOWN

## 2017-05-25 RX ORDER — CARVEDILOL 12.5 MG/1
25 TABLET ORAL 2 TIMES DAILY WITH MEALS
Status: DISCONTINUED | OUTPATIENT
Start: 2017-05-25 | End: 2017-05-26 | Stop reason: HOSPADM

## 2017-05-25 RX ORDER — BACITRACIN 500 [USP'U]/G
1 OINTMENT TOPICAL 3 TIMES DAILY
Status: DISCONTINUED | OUTPATIENT
Start: 2017-05-25 | End: 2017-05-26 | Stop reason: HOSPADM

## 2017-05-25 RX ORDER — AMLODIPINE BESYLATE 5 MG/1
10 TABLET ORAL DAILY
Status: DISCONTINUED | OUTPATIENT
Start: 2017-05-25 | End: 2017-05-26 | Stop reason: HOSPADM

## 2017-05-25 RX ORDER — IPRATROPIUM BROMIDE AND ALBUTEROL SULFATE 2.5; .5 MG/3ML; MG/3ML
3 SOLUTION RESPIRATORY (INHALATION)
Status: DISCONTINUED | OUTPATIENT
Start: 2017-05-25 | End: 2017-05-26 | Stop reason: HOSPADM

## 2017-05-25 RX ADMIN — HYDRALAZINE HYDROCHLORIDE 20 MG: 20 INJECTION INTRAMUSCULAR; INTRAVENOUS at 18:38

## 2017-05-25 RX ADMIN — DOCUSATE SODIUM AND SENNOSIDES 1 TABLET: 8.6; 5 TABLET, FILM COATED ORAL at 09:25

## 2017-05-25 RX ADMIN — BACITRACIN 1 G: 500 OINTMENT TOPICAL at 21:25

## 2017-05-25 RX ADMIN — Medication 10 ML: at 14:00

## 2017-05-25 RX ADMIN — MORPHINE SULFATE 30 MG: 15 TABLET, FILM COATED, EXTENDED RELEASE ORAL at 14:14

## 2017-05-25 RX ADMIN — Medication 10 ML: at 05:53

## 2017-05-25 RX ADMIN — HYDRALAZINE HYDROCHLORIDE 20 MG: 20 INJECTION INTRAMUSCULAR; INTRAVENOUS at 03:42

## 2017-05-25 RX ADMIN — IPRATROPIUM BROMIDE AND ALBUTEROL SULFATE 3 ML: .5; 3 SOLUTION RESPIRATORY (INHALATION) at 12:28

## 2017-05-25 RX ADMIN — MORPHINE SULFATE 30 MG: 15 TABLET, FILM COATED, EXTENDED RELEASE ORAL at 21:25

## 2017-05-25 RX ADMIN — MORPHINE SULFATE 30 MG: 15 TABLET, FILM COATED, EXTENDED RELEASE ORAL at 05:52

## 2017-05-25 RX ADMIN — IPRATROPIUM BROMIDE AND ALBUTEROL SULFATE 3 ML: .5; 3 SOLUTION RESPIRATORY (INHALATION) at 07:58

## 2017-05-25 RX ADMIN — Medication 10 ML: at 21:25

## 2017-05-25 RX ADMIN — BACITRACIN 1 G: 500 OINTMENT TOPICAL at 17:36

## 2017-05-25 RX ADMIN — Medication 400 MG: at 17:36

## 2017-05-25 RX ADMIN — HEPARIN SODIUM 5000 UNITS: 5000 INJECTION, SOLUTION INTRAVENOUS; SUBCUTANEOUS at 05:53

## 2017-05-25 RX ADMIN — SODIUM CHLORIDE 40 MG: 9 INJECTION INTRAMUSCULAR; INTRAVENOUS; SUBCUTANEOUS at 09:25

## 2017-05-25 RX ADMIN — AMLODIPINE BESYLATE 10 MG: 5 TABLET ORAL at 12:32

## 2017-05-25 RX ADMIN — HYDRALAZINE HYDROCHLORIDE 20 MG: 20 INJECTION INTRAMUSCULAR; INTRAVENOUS at 14:05

## 2017-05-25 RX ADMIN — CARVEDILOL 25 MG: 12.5 TABLET, FILM COATED ORAL at 17:36

## 2017-05-25 RX ADMIN — HEPARIN SODIUM 5000 UNITS: 5000 INJECTION, SOLUTION INTRAVENOUS; SUBCUTANEOUS at 21:25

## 2017-05-25 RX ADMIN — LISINOPRIL 40 MG: 20 TABLET ORAL at 09:28

## 2017-05-25 RX ADMIN — Medication 400 MG: at 09:25

## 2017-05-25 RX ADMIN — CARVEDILOL 12.5 MG: 12.5 TABLET, FILM COATED ORAL at 09:28

## 2017-05-25 RX ADMIN — HEPARIN SODIUM 5000 UNITS: 5000 INJECTION, SOLUTION INTRAVENOUS; SUBCUTANEOUS at 14:14

## 2017-05-25 NOTE — PROGRESS NOTES
Problem: Heart Failure: Day 4  Goal: *Oxygen saturation within defined limits  Outcome: Progressing Towards Goal  Patient with normal oxygen saturation on room air. Goal: *Optimal pain control at patients stated goal  Outcome: Progressing Towards Goal  Patient's pain is being controlled with the scheduled MS contin.

## 2017-05-25 NOTE — PROGRESS NOTES
ADULT PROTOCOL: JET AEROSOL ASSESSMENT    Patient  Prosper Kirkland     62 y.o.   male     5/25/2017  12:34 PM    Breath Sounds Pre Procedure: Right Breath Sounds: Clear, Diminished                               Left Breath Sounds: Clear, Diminished    Breath Sounds Post Procedure: Right Breath Sounds: Diminished                                 Left Breath Sounds: Diminished    Breathing pattern: Pre procedure Breathing Pattern: Regular          Post procedure Breathing Pattern: Regular    Heart Rate: Pre procedure Pulse: 80           Post procedure Pulse: 88    Resp Rate: Pre procedure Respirations: 16           Post procedure Respirations: 18    Peak Flow: Pre bronchodilator             Post bronchodilator       FVC/FEV1:      Incentive Spirometry:             Cough: Pre procedure                 Post procedure Cough: Non-productive    Suctioned: NO    Sputum: Pre procedure                   Post procedure      Oxygen: O2 Device: Room air   FiO2 (%) . 21     Changed: NO    SpO2: Pre procedure SpO2: 97 %   without oxygen              Post procedure SpO2: 97 %  without oxygen    Nebulizer Therapy: Current medications Aerosolized Medications: DuoNeb      Changed: YES; changed to PRN    Smoking History:     Problem List:   Patient Active Problem List   Diagnosis Code    Chronic pain G89.29    Acute respiratory failure ([facility]) J96.00    Drug overdose T50.901A    Elevated troponin R74.8    Acute renal failure (ARF) ([facility]) N17.9    Chronic diastolic heart failure ([facility] [address] I50.32       Respiratory Therapist: Isamar Hickey, RT

## 2017-05-25 NOTE — PROGRESS NOTES
Problem: Dysphagia (Adult)  Goal: *Acute Goals and Plan of Care (Insert Text)  Speech therapy goals  Initiated 5/23/2017   1. Patient will tolerate mechanical soft diet with timely and complete mastication and no s/s of aspiration within 7 days MET 5/25/2017   Reviewed chart and discussed case with nsg who reports increased intake this morning eating >50% of tray. Mental status remains decreased, though question his baseline function and suspect he may be close to if not at baseline. Patient reports he is happy with his current diet due to his limited dentition. Will sign off SLP services as patient is tolerating mechanical soft diet without s/s of aspiration or difficulty and does not wish for consideration of upgrade, which is appropriate given his mentation and dentition. Please re-consult if further needs arise. Thanks. Yamilex Mir M.CD.  CCC-SLP

## 2017-05-25 NOTE — PROGRESS NOTES
TRANSFER - IN REPORT:    Verbal report received from [facility] (name) on Diana Gutierrez  being received from ICU (unit) for routine progression of care      Report consisted of patients Situation, Background, Assessment and   Recommendations(SBAR). Information from the following report(s) SBAR, MAR, Accordion, Recent Results and Cardiac Rhythm NSR was reviewed with the receiving nurse. Opportunity for questions and clarification was provided. Assessment completed upon patients arrival to unit and care assumed. Primary Nurse Dennise Eugene RN and Fabio Espinoza RN performed a dual skin assessment on this patient Impairment noted- see wound doc flow sheet  Kavin score is 18        Bedside and Verbal shift change report given to Sanjeev Wilkes  (oncoming nurse) by Honey Serrano RN (offgoing nurse). Report included the following information SBAR, Intake/Output, MAR, Recent Results and Cardiac Rhythm NSR.
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
## 2017-05-26 VITALS
DIASTOLIC BLOOD PRESSURE: 99 MMHG | BODY MASS INDEX: 31.21 KG/M2 | TEMPERATURE: 97.6 F | OXYGEN SATURATION: 96 % | SYSTOLIC BLOOD PRESSURE: 151 MMHG | RESPIRATION RATE: 18 BRPM | HEIGHT: 67 IN | HEART RATE: 93 BPM | WEIGHT: 198.85 LBS

## 2017-05-26 PROBLEM — R77.8 ELEVATED TROPONIN: Status: RESOLVED | Noted: 2017-05-21 | Resolved: 2017-05-26

## 2017-05-26 PROBLEM — T50.901A DRUG OVERDOSE: Status: RESOLVED | Noted: 2017-05-21 | Resolved: 2017-05-26

## 2017-05-26 PROBLEM — J96.00 ACUTE RESPIRATORY FAILURE (HCC): Status: RESOLVED | Noted: 2017-05-21 | Resolved: 2017-05-26

## 2017-05-26 PROBLEM — N17.9 ACUTE RENAL FAILURE (ARF) (HCC): Status: RESOLVED | Noted: 2017-05-21 | Resolved: 2017-05-26

## 2017-05-26 LAB
ANION GAP BLD CALC-SCNC: 9 MMOL/L (ref 5–15)
BASOPHILS # BLD AUTO: 0 K/UL (ref 0–0.1)
BASOPHILS # BLD: 0 % (ref 0–1)
BUN SERPL-MCNC: 6 MG/DL (ref 6–20)
BUN/CREAT SERPL: 11 (ref 12–20)
CALCIUM SERPL-MCNC: 9.3 MG/DL (ref 8.5–10.1)
CHLORIDE SERPL-SCNC: 104 MMOL/L (ref 97–108)
CO2 SERPL-SCNC: 23 MMOL/L (ref 21–32)
CREAT SERPL-MCNC: 0.57 MG/DL (ref 0.7–1.3)
CRYOGLOB SER QL 1D COLD INC: NORMAL
EOSINOPHIL # BLD: 0.5 K/UL (ref 0–0.4)
EOSINOPHIL NFR BLD: 5 % (ref 0–7)
ERYTHROCYTE [DISTWIDTH] IN BLOOD BY AUTOMATED COUNT: 14.5 % (ref 11.5–14.5)
GLUCOSE SERPL-MCNC: 99 MG/DL (ref 65–100)
HCT VFR BLD AUTO: 38.9 % (ref 36.6–50.3)
HGB BLD-MCNC: 13.2 G/DL (ref 12.1–17)
LYMPHOCYTES # BLD AUTO: 22 % (ref 12–49)
LYMPHOCYTES # BLD: 2.2 K/UL (ref 0.8–3.5)
MCH RBC QN AUTO: 29.9 PG (ref 26–34)
MCHC RBC AUTO-ENTMCNC: 33.9 G/DL (ref 30–36.5)
MCV RBC AUTO: 88.2 FL (ref 80–99)
MONOCYTES # BLD: 1.2 K/UL (ref 0–1)
MONOCYTES NFR BLD AUTO: 12 % (ref 5–13)
NEUTS SEG # BLD: 5.9 K/UL (ref 1.8–8)
NEUTS SEG NFR BLD AUTO: 61 % (ref 32–75)
PLATELET # BLD AUTO: 332 K/UL (ref 150–400)
POTASSIUM SERPL-SCNC: 3.3 MMOL/L (ref 3.5–5.1)
RBC # BLD AUTO: 4.41 M/UL (ref 4.1–5.7)
SODIUM SERPL-SCNC: 136 MMOL/L (ref 136–145)
WBC # BLD AUTO: 9.8 K/UL (ref 4.1–11.1)

## 2017-05-26 PROCEDURE — 74011250636 HC RX REV CODE- 250/636: Performed by: FAMILY MEDICINE

## 2017-05-26 PROCEDURE — C9113 INJ PANTOPRAZOLE SODIUM, VIA: HCPCS | Performed by: INTERNAL MEDICINE

## 2017-05-26 PROCEDURE — 74011250636 HC RX REV CODE- 250/636: Performed by: INTERNAL MEDICINE

## 2017-05-26 PROCEDURE — 36415 COLL VENOUS BLD VENIPUNCTURE: CPT | Performed by: STUDENT IN AN ORGANIZED HEALTH CARE EDUCATION/TRAINING PROGRAM

## 2017-05-26 PROCEDURE — 74011250637 HC RX REV CODE- 250/637: Performed by: INTERNAL MEDICINE

## 2017-05-26 PROCEDURE — 74011250637 HC RX REV CODE- 250/637: Performed by: STUDENT IN AN ORGANIZED HEALTH CARE EDUCATION/TRAINING PROGRAM

## 2017-05-26 PROCEDURE — 74011250637 HC RX REV CODE- 250/637: Performed by: PHYSICAL MEDICINE & REHABILITATION

## 2017-05-26 PROCEDURE — 85025 COMPLETE CBC W/AUTO DIFF WBC: CPT | Performed by: STUDENT IN AN ORGANIZED HEALTH CARE EDUCATION/TRAINING PROGRAM

## 2017-05-26 PROCEDURE — 80048 BASIC METABOLIC PNL TOTAL CA: CPT | Performed by: STUDENT IN AN ORGANIZED HEALTH CARE EDUCATION/TRAINING PROGRAM

## 2017-05-26 PROCEDURE — 74011000250 HC RX REV CODE- 250: Performed by: INTERNAL MEDICINE

## 2017-05-26 PROCEDURE — 74011250636 HC RX REV CODE- 250/636: Performed by: PHYSICIAN ASSISTANT

## 2017-05-26 RX ORDER — CARVEDILOL 12.5 MG/1
25 TABLET ORAL 2 TIMES DAILY WITH MEALS
Qty: 60 TAB | Refills: 0 | Status: SHIPPED | OUTPATIENT
Start: 2017-05-26

## 2017-05-26 RX ORDER — FUROSEMIDE 40 MG/1
40 TABLET ORAL DAILY
Qty: 60 TAB | Refills: 0 | Status: SHIPPED | OUTPATIENT
Start: 2017-05-26 | End: 2018-01-09

## 2017-05-26 RX ORDER — MORPHINE SULFATE 30 MG/1
30 TABLET, FILM COATED, EXTENDED RELEASE ORAL EVERY 8 HOURS
Qty: 21 TAB | Refills: 0 | Status: SHIPPED | OUTPATIENT
Start: 2017-05-26 | End: 2017-06-02

## 2017-05-26 RX ORDER — AMLODIPINE BESYLATE 10 MG/1
5 TABLET ORAL DAILY
Qty: 30 TAB | Refills: 0 | Status: SHIPPED | OUTPATIENT
Start: 2017-05-26

## 2017-05-26 RX ORDER — AMOXICILLIN 250 MG
1 CAPSULE ORAL DAILY
Qty: 30 TAB | Refills: 0 | Status: SHIPPED | OUTPATIENT
Start: 2017-05-26

## 2017-05-26 RX ADMIN — MORPHINE SULFATE 30 MG: 15 TABLET, FILM COATED, EXTENDED RELEASE ORAL at 05:14

## 2017-05-26 RX ADMIN — Medication 400 MG: at 08:40

## 2017-05-26 RX ADMIN — AMLODIPINE BESYLATE 10 MG: 5 TABLET ORAL at 08:40

## 2017-05-26 RX ADMIN — Medication 10 ML: at 05:15

## 2017-05-26 RX ADMIN — CARVEDILOL 25 MG: 12.5 TABLET, FILM COATED ORAL at 08:39

## 2017-05-26 RX ADMIN — LISINOPRIL 40 MG: 20 TABLET ORAL at 08:39

## 2017-05-26 RX ADMIN — HYDRALAZINE HYDROCHLORIDE 20 MG: 20 INJECTION INTRAMUSCULAR; INTRAVENOUS at 06:17

## 2017-05-26 RX ADMIN — BACITRACIN 1 G: 500 OINTMENT TOPICAL at 08:38

## 2017-05-26 RX ADMIN — SODIUM CHLORIDE 40 MG: 9 INJECTION INTRAMUSCULAR; INTRAVENOUS; SUBCUTANEOUS at 08:39

## 2017-05-26 RX ADMIN — HEPARIN SODIUM 5000 UNITS: 5000 INJECTION, SOLUTION INTRAVENOUS; SUBCUTANEOUS at 05:15

## 2017-05-26 RX ADMIN — Medication 10 ML: at 05:16

## 2017-05-26 RX ADMIN — DOCUSATE SODIUM AND SENNOSIDES 1 TABLET: 8.6; 5 TABLET, FILM COATED ORAL at 08:40

## 2017-05-26 NOTE — PROGRESS NOTES
Bedside and Verbal shift change report given to [facility], [address] (oncoming nurse) by Ines Ch RN (offgoing nurse). Report included the following information SBAR, Kardex, Intake/Output, MAR, Accordion, Recent Results and Cardiac Rhythm NSR.

## 2017-05-26 NOTE — ROUTINE PROCESS
Hospital follow up PCP appointment has been scheduled with Dr. Geoff Sparks on Friday, 6/2/17 at 1:40 a.m. Office requesting d/c summary to be faxed . Pending patient discharge.   Alexandra Barton, Care Management Specialist.

## 2017-05-26 NOTE — PROGRESS NOTES
Bedside shift change report given to Abdon Cano RN and Frances Dwyer RN  (oncoming nurse) by Kady Larkin RN (offgoing nurse). Report included the following information SBAR, Kardex and Recent Results.

## 2017-05-26 NOTE — PROGRESS NOTES
CM noted order for discharge. Patient and wife familiar with this CM from last admission. Met with patient to discuss discharge and transportation. Also called wife to discuss transportation home. Wife asked that this CM calls the [facility] at [phone] to schedule a ride to [place], [place]. Wife said she is willing to pay between $150-160.00 for the cost of his trip. CM called and scheduled a cab for 1:00 pm today. Bedside nurse Loly Pena RN and charge nurse Jaymie Amin RN notified of  time. No further discharge needs identified.  Lloyd Gage MSA, RN, CRM

## 2017-05-26 NOTE — DISCHARGE INSTRUCTIONS
Discharge Instructions       PATIENT ID: Sav Hernandez  MRN: [id number]   YOB: 1955    DATE OF ADMISSION: 5/21/2017  7:14 PM    DATE OF DISCHARGE: 5/26/2017    PRIMARY CARE PROVIDER: Berny Lopez     ATTENDING PHYSICIAN: Abby Hamilton MD  DISCHARGING PROVIDER: Abby Hamilton MD    To contact this individual call [phone] and ask the  to page. If unavailable ask to be transferred the Adult Hospitalist Department. DISCHARGE DIAGNOSES     Acute hypercapneic / hypoxic respiratory failure    Chronic combined systolic/diastolic heart failure    Elevated troponin    NESS    CONSULTATIONS: IP CONSULT TO NEPHROLOGY  IP CONSULT TO INTENSIVIST  IP CONSULT TO PALLIATIVE CARE - PROVIDER  IP CONSULT TO CARDIOLOGY    PROCEDURES/SURGERIES: * No surgery found *    PENDING TEST RESULTS:   At the time of discharge the following test results are still pending:    None. FOLLOW UP APPOINTMENTS:   Follow-up Information     Follow up With Details Comments [address] an appointment as soon as possible for a visit in 1 week  [address] [facility]  [id number] [place] [address]      Jan Josue MD Schedule an appointment as soon as possible for a visit in 1 week for pain management [facility]  [phone]             ADDITIONAL CARE RECOMMENDATIONS:     You were started on the following medications:   (1) Amlodipine - which is a blood pressure medication   (2) MSContin - which is a narcotic pain medication. You were prescribed 30 mg three times daily. You should stop your previous pain medication regimen and only take this as prescribed. You should follow with Dr. Katherin Castano next week and your pain specialists may adjust your medications as needed. This can cause constipation, lethargy, urinary retention among other side effects. (3) Pericolace - which is a stool softener.     DIET: Cardiac Diet    ACTIVITY: Activity as tolerated    WOUND CARE: N/A    EQUIPMENT needed: N/A      DISCHARGE MEDICATIONS:   See Medication Reconciliation Form    · It is important that you take the medication exactly as they are prescribed. · Keep your medication in the bottles provided by the pharmacist and keep a list of the medication names, dosages, and times to be taken in your wallet. · Do not take other medications without consulting your doctor. NOTIFY YOUR PHYSICIAN FOR ANY OF THE FOLLOWING:   Fever over 101 degrees for 24 hours. Chest pain, shortness of breath, fever, chills, nausea, vomiting, diarrhea, change in mentation, falling, weakness, bleeding. Severe pain or pain not relieved by medications. Or, any other signs or symptoms that you may have questions about.       DISPOSITION:  x  Home With:   OT  PT  HH  RN       SNF/Inpatient Rehab/LTAC    Independent/assisted living    Hospice    Other:     CDMP Checked:   Yes x     PROBLEM LIST Updated:  Yes x       Signed:   Padmini Juarez MD  5/26/2017  10:28 AM

## 2017-05-26 NOTE — PROGRESS NOTES
Return Call placed to patients wife Emma Galvin. She was very upset about Mr Zuleta home medications not being sent home with patient when discharged. Told her that Mr Haritha Cage was asked if he received all of his medications and he told his nurse- yes. Mrs Haritha Cage was made aware that the medication list had changed and there were some medications that had been stopped. There were several bottles of morphine ER 1- 80 mg bottle and 1 100 mg bottle. Patient was given a new RX for MS Contin so the wife asked if we could dispose of the narcotics and also the gabapentin. Witnessed by Myke Haro. These bottles of medications were given to the pharmacy. The rest of the home medications will be sent to the home address via mail.
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
## 2017-05-26 NOTE — PROGRESS NOTES
0800: Bedside shift change report given to Dakota Palma (oncoming nurse) by Jessy Awan (offgoing nurse). Report included the following information SBAR, Kardex, Procedure Summary, Intake/Output, MAR and Recent Results.

## 2017-05-27 LAB
BACTERIA SPEC CULT: NORMAL
SERVICE CMNT-IMP: NORMAL

## 2018-01-09 PROBLEM — F31.31 MILD DEPRESSED BIPOLAR I DISORDER (HCC): Status: ACTIVE | Noted: 2018-01-09

## 2018-01-09 PROBLEM — F31.31 MILD DEPRESSED BIPOLAR I DISORDER (HCC): Status: RESOLVED | Noted: 2018-01-09 | Resolved: 2018-01-09
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
## 2023-10-10 NOTE — ROUTINE PROCESS
TRANSFER - OUT REPORT:    Verbal report given to Sanjuana Jones(name) on ALONZO Briones  being transferred to ICU(unit) for routine progression of care       Report consisted of patients Situation, Background, Assessment and   Recommendations(SBAR). Information from the following report(s) SBAR, Kardex, ED Summary and MAR was reviewed with the receiving nurse. Lines:   Peripheral IV 05/15/17 Left Wrist (Active)       Peripheral IV 05/21/17 Right Antecubital (Active)   Site Assessment Clean, dry, & intact 5/21/2017  9:14 PM   Phlebitis Assessment 0 5/21/2017  9:14 PM   Infiltration Assessment 4 5/21/2017  9:14 PM   Dressing Status Intact; Clean, dry, & intact 5/21/2017  9:14 PM   Dressing Type 4 X 4 5/21/2017  9:14 PM   Hub Color/Line Status Green 5/21/2017  9:14 PM        Opportunity for questions and clarification was provided.       Patient transported with:   Monitor  Registered Nurse The patient's goals for the shift include keep O2 sat 92% and above    The clinical goals for the shift include wean o2    Over the shift, the patient was weaned to 2L